# Patient Record
Sex: MALE | Race: WHITE | NOT HISPANIC OR LATINO | Employment: OTHER | URBAN - METROPOLITAN AREA
[De-identification: names, ages, dates, MRNs, and addresses within clinical notes are randomized per-mention and may not be internally consistent; named-entity substitution may affect disease eponyms.]

---

## 2017-01-12 ENCOUNTER — HOSPITAL ENCOUNTER (OUTPATIENT)
Dept: RADIOLOGY | Facility: MEDICAL CENTER | Age: 67
End: 2017-01-12
Attending: OPHTHALMOLOGY
Payer: MEDICARE

## 2017-01-12 DIAGNOSIS — H35.89 RETINAL MACULAR ATROPHY: ICD-10-CM

## 2017-01-12 PROCEDURE — 93880 EXTRACRANIAL BILAT STUDY: CPT | Mod: 26 | Performed by: SURGERY

## 2017-01-12 PROCEDURE — 93880 EXTRACRANIAL BILAT STUDY: CPT

## 2017-04-04 ENCOUNTER — OFFICE VISIT (OUTPATIENT)
Dept: URGENT CARE | Facility: CLINIC | Age: 67
End: 2017-04-04
Payer: MEDICARE

## 2017-04-04 ENCOUNTER — APPOINTMENT (OUTPATIENT)
Dept: RADIOLOGY | Facility: IMAGING CENTER | Age: 67
End: 2017-04-04
Attending: NURSE PRACTITIONER
Payer: MEDICARE

## 2017-04-04 VITALS
TEMPERATURE: 97.8 F | SYSTOLIC BLOOD PRESSURE: 120 MMHG | HEART RATE: 64 BPM | RESPIRATION RATE: 18 BRPM | OXYGEN SATURATION: 97 % | DIASTOLIC BLOOD PRESSURE: 78 MMHG

## 2017-04-04 DIAGNOSIS — R06.02 SOB (SHORTNESS OF BREATH): ICD-10-CM

## 2017-04-04 DIAGNOSIS — R05.9 COUGH: ICD-10-CM

## 2017-04-04 DIAGNOSIS — J06.9 UPPER RESPIRATORY TRACT INFECTION, UNSPECIFIED TYPE: ICD-10-CM

## 2017-04-04 PROCEDURE — G8421 BMI NOT CALCULATED: HCPCS | Performed by: NURSE PRACTITIONER

## 2017-04-04 PROCEDURE — 1101F PT FALLS ASSESS-DOCD LE1/YR: CPT | Mod: 8P | Performed by: NURSE PRACTITIONER

## 2017-04-04 PROCEDURE — 99214 OFFICE O/P EST MOD 30 MIN: CPT | Performed by: NURSE PRACTITIONER

## 2017-04-04 PROCEDURE — 71020 DX-CHEST-2 VIEWS: CPT | Mod: TC | Performed by: NURSE PRACTITIONER

## 2017-04-04 PROCEDURE — 4004F PT TOBACCO SCREEN RCVD TLK: CPT | Mod: 8P | Performed by: NURSE PRACTITIONER

## 2017-04-04 PROCEDURE — G8432 DEP SCR NOT DOC, RNG: HCPCS | Performed by: NURSE PRACTITIONER

## 2017-04-04 PROCEDURE — 3017F COLORECTAL CA SCREEN DOC REV: CPT | Mod: 8P | Performed by: NURSE PRACTITIONER

## 2017-04-04 PROCEDURE — 4040F PNEUMOC VAC/ADMIN/RCVD: CPT | Mod: 8P | Performed by: NURSE PRACTITIONER

## 2017-04-04 RX ORDER — PREDNISONE 10 MG/1
TABLET ORAL
Qty: 15 TAB | Refills: 0 | Status: SHIPPED | OUTPATIENT
Start: 2017-04-04 | End: 2017-04-08

## 2017-04-04 RX ORDER — AZITHROMYCIN 250 MG/1
TABLET, FILM COATED ORAL
Qty: 6 TAB | Refills: 0 | Status: SHIPPED | OUTPATIENT
Start: 2017-04-04 | End: 2017-04-13

## 2017-04-04 ASSESSMENT — ENCOUNTER SYMPTOMS
COUGH: 1
WHEEZING: 0
FEVER: 0
CHILLS: 1
SHORTNESS OF BREATH: 1
SPUTUM PRODUCTION: 0

## 2017-04-04 ASSESSMENT — COPD QUESTIONNAIRES: COPD: 1

## 2017-04-04 NOTE — MR AVS SNAPSHOT
Edwin Gregorio   2017 4:30 PM   Office Visit   MRN: [id number]    Department:  [facility]   Dept Phone:  [phone]    Description:  Male : 1950   Provider:  RUBÉN Lowery           Reason for Visit     Cough Almost a week dry cough and chest congestion .      Allergies as of 2017     Allergen Noted Reactions    Augmentin 10/18/2014         You were diagnosed with     Upper respiratory tract infection, unspecified type   [4077639]       Cough   [786.2.ICD-9-CM]       SOB (shortness of breath)   [604216]         Vital Signs     Blood Pressure Pulse Temperature Respirations Oxygen Saturation Smoking Status    120/78 mmHg 64 36.6 °C (97.8 °F) 18 97% Current Every Day Smoker      Basic Information     Date Of Birth Sex Race Ethnicity Preferred Language    1950 Male White Non- English      Problem List              ICD-10-CM Priority Class Noted - Resolved    Confusion R41.0   10/18/2014 - Present    Chest pain R07.9   10/18/2014 - Present      Health Maintenance        Date Due Completion Dates    IMM DTaP/Tdap/Td Vaccine (1 - Tdap) 1970 ---    COLONOSCOPY 2001 ---    IMM ZOSTER VACCINE 2011 ---    IMM PNEUMOCOCCAL 65+ (ADULT) LOW/MEDIUM RISK SERIES (1 of 2 - PCV13) 2016 ---            Current Immunizations     No immunizations on file.      Below and/or attached are the medications your provider expects you to take. Review all of your home medications and newly ordered medications with your provider and/or pharmacist. Follow medication instructions as directed by your provider and/or pharmacist. Please keep your medication list with you and share with your provider. Update the information when medications are discontinued, doses are changed, or new medications (including over-the-counter products) are added; and carry medication information at all times in the event of emergency situations     Allergies:  AUGMENTIN - (reactions not  documented)               Medications  Valid as of: April 04, 2017 -  5:21 PM    Generic Name Brand Name Tablet Size Instructions for use    Atorvastatin Calcium (Tab) LIPITOR 20 MG Take 40 mg by mouth every evening.        Azithromycin (Tab) ZITHROMAX 250 MG Take 2 tabs PO on the first day, then one tab PO daily for 4 days        Clindamycin HCl (Cap) CLEOCIN 300 MG Take 1 Cap by mouth 4 times a day.        Lansoprazole (CAPSULE DELAYED RELEASE) PREVACID 30 MG Take 30 mg by mouth every day.        Morphine Sulfate   Take 15 mg by mouth 2 Times a Day.        Multiple Vitamin   Take  by mouth.        OLANZapine (Tab) ZYPREXA 5 MG Take 5 mg by mouth every evening.        Omega-3 Fatty Acids (Cap) fish oil 1000 MG Take 1,000 mg by mouth 3 times a day, with meals.        PARoxetine HCl (Tab) PAXIL 30 MG Take 30 mg by mouth 2 Times a Day.        PredniSONE (Tab) DELTASONE 10 MG Take 3 tabs PO daily for five days        SUMAtriptan Succinate (Tab) IMITREX 50 MG Take 100 mg by mouth Once PRN.        Topiramate (Tab) TOPAMAX 100 MG Take 100 mg by mouth 2 times a day.        TraZODone HCl (Tab) DESYREL 100 MG Take 100 mg by mouth every evening.        .                 Medicines prescribed today were sent to:     [facility] #[id number] [address] [address]    Phone: [phone] Fax: [phone]    Open 24 Hours?: No      Medication refill instructions:       If your prescription bottle indicates you have medication refills left, it is not necessary to call your provider’s office. Please contact your pharmacy and they will refill your medication.    If your prescription bottle indicates you do not have any refills left, you may request refills at any time through one of the following ways: The online BillGuard system (except Urgent Care), by calling your provider’s office, or by asking your pharmacy to contact your provider’s office with a refill request.  Medication refills are processed only during regular business hours and may not be available until the next business day. Your provider may request additional information or to have a follow-up visit with you prior to refilling your medication.   *Please Note: Medication refills are assigned a new Rx number when refilled electronically. Your pharmacy may indicate that no refills were authorized even though a new prescription for the same medication is available at the pharmacy. Please request the medicine by name with the pharmacy before contacting your provider for a refill.        Your To Do List     Future Labs/Procedures Complete By Expires    DX-CHEST-2 VIEWS  As directed 4/4/2018         Ziptask Access Code: [email or web address]  Expires: 5/4/2017  5:21 PM    Ziptask  A secure, online tool to manage your health information     [employer]’s Ziptask® is a secure, online tool that connects you to your personalized health information from the privacy of your home -- day or night - making it very easy for you to manage your healthcare. Once the activation process is completed, you can even access your medical information using the Ziptask machelle, which is available for free in the Apple Machelle store or Google Play store.     Ziptask provides the following levels of access (as shown below):   My Chart Features   [facility] Primary Care Doctor [facility]  Specialists [facility]  Urgent  Care Non-[facility]  Primary Care  Doctor   Email your healthcare team securely and privately 24/7 X X X    Manage appointments: schedule your next appointment; view details of past/upcoming appointments X      Request prescription refills. X      View recent personal medical records, including lab and immunizations X X X X   View health record, including health history, allergies, medications X X X X   Read reports about your outpatient visits, procedures, consult and ER notes X X X X   See your discharge summary, which is a recap of your hospital and/or  ER visit that includes your diagnosis, lab results, and care plan. X X       How to register for HackerRank:  1. Go to  [email or web address].  2. Click on the Sign Up Now box, which takes you to the New Member Sign Up page. You will need to provide the following information:  a. Enter your HackerRank Access Code exactly as it appears at the top of this page. (You will not need to use this code after you’ve completed the sign-up process. If you do not sign up before the expiration date, you must request a new code.)   b. Enter your date of birth.   c. Enter your home email address.   d. Click Submit, and follow the next screen’s instructions.  3. Create a CloudCart ID. This will be your CloudCart login ID and cannot be changed, so think of one that is secure and easy to remember.  4. Create a CloudCart password. You can change your password at any time.  5. Enter your Password Reset Question and Answer. This can be used at a later time if you forget your password.   6. Enter your e-mail address. This allows you to receive e-mail notifications when new information is available in HackerRank.  7. Click Sign Up. You can now view your health information.    For assistance activating your HackerRank account, call [phone]

## 2017-04-05 NOTE — PROGRESS NOTES
Subjective:      Edwin Gregorio is a 66 y.o. male who presents with Cough            Cough  This is a new problem. Episode onset: on and off for over one month. The problem has been unchanged. The cough is non-productive. Associated symptoms include chills and shortness of breath. Pertinent negatives include no fever or wheezing. Associated symptoms comments: Chest congestion. Nothing aggravates the symptoms. Risk factors for lung disease include smoking/tobacco exposure. He has tried OTC cough suppressant and rest for the symptoms. The treatment provided no relief. His past medical history is significant for COPD.       Review of Systems   Constitutional: Positive for chills and malaise/fatigue. Negative for fever.   HENT: Positive for congestion.    Respiratory: Positive for cough and shortness of breath. Negative for sputum production and wheezing.    All other systems reviewed and are negative.    PMH:  has a past medical history of Depression and Chronic airway obstruction, not elsewhere classified. He also has no past medical history of Hypertension.  MEDS:   Current outpatient prescriptions:   •  azithromycin (ZITHROMAX) 250 MG Tab, Take 2 tabs PO on the first day, then one tab PO daily for 4 days, Disp: 6 Tab, Rfl: 0  •  predniSONE (DELTASONE) 10 MG Tab, Take 3 tabs PO daily for five days, Disp: 15 Tab, Rfl: 0  •  lansoprazole (PREVACID) 30 MG CAPSULE DELAYED RELEASE, Take 30 mg by mouth every day., Disp: , Rfl:   •  MORPHINE SULFATE, CONCENTRATE, PO, Take 15 mg by mouth 2 Times a Day., Disp: , Rfl:   •  topiramate (TOPAMAX) 100 MG TABS, Take 100 mg by mouth 2 times a day., Disp: , Rfl:   •  paroxetine (PAXIL) 30 MG TABS, Take 30 mg by mouth 2 Times a Day., Disp: , Rfl:   •  sumatriptan (IMITREX) 50 MG TABS, Take 100 mg by mouth Once PRN., Disp: , Rfl:   •  trazodone (DESYREL) 100 MG TABS, Take 100 mg by mouth every evening., Disp: , Rfl:   •  atorvastatin (LIPITOR) 20 MG TABS, Take 40 mg by mouth every  evening., Disp: , Rfl:   •  olanzapine (ZYPREXA) 5 MG TABS, Take 5 mg by mouth every evening., Disp: , Rfl:   •  Omega-3 Fatty Acids (FISH OIL) 1000 MG Cap capsule, Take 1,000 mg by mouth 3 times a day, with meals., Disp: , Rfl:   •  Multiple Vitamin (MULTI VITAMIN DAILY PO), Take  by mouth., Disp: , Rfl:   •  clindamycin (CLEOCIN) 300 MG Cap, Take 1 Cap by mouth 4 times a day., Disp: 40 Cap, Rfl: 0  ALLERGIES:   Allergies   Allergen Reactions   • Augmentin      SURGHX: No past surgical history on file.  SOCHX:  reports that he has been smoking Cigarettes.  He has been smoking about 1.00 pack per day. He does not have any smokeless tobacco history on file. He reports that he does not drink alcohol or use illicit drugs.  FH: In accordance with VINCE Act of 2008, family history is not collected for Occupational Health visits.         Objective:     /78 mmHg  Pulse 64  Temp(Src) 36.6 °C (97.8 °F)  Resp 18  SpO2 97%     Physical Exam   Constitutional: He is oriented to person, place, and time. Vital signs are normal. He appears well-developed and well-nourished.   HENT:   Head: Normocephalic.   Right Ear: External ear normal.   Left Ear: External ear normal.   Nose: Rhinorrhea present.   Mouth/Throat: Oropharynx is clear and moist.   Eyes: EOM are normal. Pupils are equal, round, and reactive to light.   Neck: Normal range of motion.   Cardiovascular: Normal rate and regular rhythm.    Pulmonary/Chest: Effort normal and breath sounds normal. No respiratory distress.   Musculoskeletal: Normal range of motion.   Lymphadenopathy:        Head (right side): Submandibular adenopathy present.        Head (left side): Submandibular adenopathy present.     He has no cervical adenopathy.   Neurological: He is alert and oriented to person, place, and time.   Skin: Skin is warm and dry.   Psychiatric: He has a normal mood and affect. His speech is normal and behavior is normal. Thought content normal.   Vitals  reviewed.         CXR: no acute cardiopulmonary process by my read, radiology pending.  4/4/2017 4:59 PM    HISTORY/REASON FOR EXAM:  Shortness of Breath  Cough few days      TECHNIQUE/EXAM DESCRIPTION AND NUMBER OF VIEWS:  Two views of the chest.    COMPARISON:  10/18/2014.    FINDINGS:  No pulmonary infiltrates or consolidations are noted.  No pleural effusions, no pneumothorax are appreciated.  Stable cardiopericardial silhouette.         Impression          1. No active cardiopulmonary abnormalities are identified.          Assessment/Plan:     1. Upper respiratory tract infection, unspecified type  - azithromycin (ZITHROMAX) 250 MG Tab; Take 2 tabs PO on the first day, then one tab PO daily for 4 days  Dispense: 6 Tab; Refill: 0    2. Cough  - DX-CHEST-2 VIEWS; Future  - predniSONE (DELTASONE) 10 MG Tab; Take 3 tabs PO daily for five days  Dispense: 15 Tab; Refill: 0    3. SOB (shortness of breath)  - DX-CHEST-2 VIEWS; Future  - predniSONE (DELTASONE) 10 MG Tab; Take 3 tabs PO daily for five days  Dispense: 15 Tab; Refill: 0    Increase fluid intake  Contingent antibiotic prescription given to patient to fill upon meeting criteria of guidelines discussed.   Supportive care, differential diagnoses, and indications for immediate follow-up discussed with patient.    Pathogenesis of diagnosis discussed including typical length and natural progression.      Instructed to return to  or nearest emergency department if symptoms fail to improve, for any change in condition, further concerns, or new concerning symptoms.  Patient states understanding of the plan of care and discharge instructions.

## 2017-04-13 ENCOUNTER — RESOLUTE PROFESSIONAL BILLING HOSPITAL PROF FEE (OUTPATIENT)
Dept: HOSPITALIST | Facility: MEDICAL CENTER | Age: 67
End: 2017-04-13
Payer: MEDICARE

## 2017-04-13 ENCOUNTER — APPOINTMENT (OUTPATIENT)
Dept: RADIOLOGY | Facility: MEDICAL CENTER | Age: 67
End: 2017-04-13
Attending: FAMILY MEDICINE
Payer: MEDICARE

## 2017-04-13 ENCOUNTER — APPOINTMENT (OUTPATIENT)
Dept: RADIOLOGY | Facility: MEDICAL CENTER | Age: 67
End: 2017-04-13
Attending: EMERGENCY MEDICINE
Payer: MEDICARE

## 2017-04-13 ENCOUNTER — HOSPITAL ENCOUNTER (OUTPATIENT)
Facility: MEDICAL CENTER | Age: 67
End: 2017-04-14
Attending: EMERGENCY MEDICINE | Admitting: HOSPITALIST
Payer: MEDICARE

## 2017-04-13 DIAGNOSIS — R94.31 ABNORMAL EKG: ICD-10-CM

## 2017-04-13 DIAGNOSIS — R06.02 SHORTNESS OF BREATH: ICD-10-CM

## 2017-04-13 DIAGNOSIS — R07.9 ACUTE CHEST PAIN: ICD-10-CM

## 2017-04-13 LAB
ANION GAP SERPL CALC-SCNC: 8 MMOL/L (ref 0–11.9)
APTT PPP: 33.2 SEC (ref 24.7–36)
BASOPHILS # BLD AUTO: 0.6 % (ref 0–1.8)
BASOPHILS # BLD: 0.07 K/UL (ref 0–0.12)
BNP SERPL-MCNC: 20 PG/ML (ref 0–100)
BUN SERPL-MCNC: 13 MG/DL (ref 8–22)
CALCIUM SERPL-MCNC: 8.7 MG/DL (ref 8.4–10.2)
CHLORIDE SERPL-SCNC: 104 MMOL/L (ref 96–112)
CO2 SERPL-SCNC: 26 MMOL/L (ref 20–33)
CREAT SERPL-MCNC: 1.39 MG/DL (ref 0.5–1.4)
EKG IMPRESSION: NORMAL
EKG IMPRESSION: NORMAL
EOSINOPHIL # BLD AUTO: 0.51 K/UL (ref 0–0.51)
EOSINOPHIL NFR BLD: 4.7 % (ref 0–6.9)
ERYTHROCYTE [DISTWIDTH] IN BLOOD BY AUTOMATED COUNT: 47.2 FL (ref 35.9–50)
GFR SERPL CREATININE-BSD FRML MDRD: 51 ML/MIN/1.73 M 2
GLUCOSE SERPL-MCNC: 153 MG/DL (ref 65–99)
HCT VFR BLD AUTO: 41.8 % (ref 42–52)
HGB BLD-MCNC: 14.2 G/DL (ref 14–18)
IMM GRANULOCYTES # BLD AUTO: 0.06 K/UL (ref 0–0.11)
IMM GRANULOCYTES NFR BLD AUTO: 0.6 % (ref 0–0.9)
INR PPP: 0.92 (ref 0.87–1.13)
LV EJECT FRACT  99904: 65
LV EJECT FRACT MOD 2C 99903: 67.83
LV EJECT FRACT MOD 4C 99902: 63.59
LV EJECT FRACT MOD BP 99901: 66.33
LYMPHOCYTES # BLD AUTO: 2.22 K/UL (ref 1–4.8)
LYMPHOCYTES NFR BLD: 20.5 % (ref 22–41)
MAGNESIUM SERPL-MCNC: 2.1 MG/DL (ref 1.5–2.5)
MCH RBC QN AUTO: 33.3 PG (ref 27–33)
MCHC RBC AUTO-ENTMCNC: 34 G/DL (ref 33.7–35.3)
MCV RBC AUTO: 97.9 FL (ref 81.4–97.8)
MONOCYTES # BLD AUTO: 0.69 K/UL (ref 0–0.85)
MONOCYTES NFR BLD AUTO: 6.4 % (ref 0–13.4)
NEUTROPHILS # BLD AUTO: 7.27 K/UL (ref 1.82–7.42)
NEUTROPHILS NFR BLD: 67.2 % (ref 44–72)
NRBC # BLD AUTO: 0 K/UL
NRBC BLD AUTO-RTO: 0 /100 WBC
PLATELET # BLD AUTO: 261 K/UL (ref 164–446)
PMV BLD AUTO: 10.7 FL (ref 9–12.9)
POTASSIUM SERPL-SCNC: 3.5 MMOL/L (ref 3.6–5.5)
PROTHROMBIN TIME: 12.2 SEC (ref 12–14.6)
RBC # BLD AUTO: 4.27 M/UL (ref 4.7–6.1)
SODIUM SERPL-SCNC: 138 MMOL/L (ref 135–145)
TROPONIN I SERPL-MCNC: <0.02 NG/ML (ref 0–0.04)
WBC # BLD AUTO: 10.8 K/UL (ref 4.8–10.8)

## 2017-04-13 PROCEDURE — 96374 THER/PROPH/DIAG INJ IV PUSH: CPT | Mod: XU

## 2017-04-13 PROCEDURE — 36415 COLL VENOUS BLD VENIPUNCTURE: CPT

## 2017-04-13 PROCEDURE — 94640 AIRWAY INHALATION TREATMENT: CPT

## 2017-04-13 PROCEDURE — 700102 HCHG RX REV CODE 250 W/ 637 OVERRIDE(OP): Performed by: HOSPITALIST

## 2017-04-13 PROCEDURE — 700111 HCHG RX REV CODE 636 W/ 250 OVERRIDE (IP): Performed by: HOSPITALIST

## 2017-04-13 PROCEDURE — 84484 ASSAY OF TROPONIN QUANT: CPT

## 2017-04-13 PROCEDURE — G0378 HOSPITAL OBSERVATION PER HR: HCPCS

## 2017-04-13 PROCEDURE — 85730 THROMBOPLASTIN TIME PARTIAL: CPT

## 2017-04-13 PROCEDURE — 94760 N-INVAS EAR/PLS OXIMETRY 1: CPT

## 2017-04-13 PROCEDURE — 700101 HCHG RX REV CODE 250: Performed by: EMERGENCY MEDICINE

## 2017-04-13 PROCEDURE — 93005 ELECTROCARDIOGRAM TRACING: CPT | Performed by: EMERGENCY MEDICINE

## 2017-04-13 PROCEDURE — 85610 PROTHROMBIN TIME: CPT

## 2017-04-13 PROCEDURE — 99220 PR INITIAL OBSERVATION CARE,LEVL III: CPT | Performed by: HOSPITALIST

## 2017-04-13 PROCEDURE — 83735 ASSAY OF MAGNESIUM: CPT

## 2017-04-13 PROCEDURE — 80048 BASIC METABOLIC PNL TOTAL CA: CPT

## 2017-04-13 PROCEDURE — 93005 ELECTROCARDIOGRAM TRACING: CPT

## 2017-04-13 PROCEDURE — 85025 COMPLETE CBC W/AUTO DIFF WBC: CPT

## 2017-04-13 PROCEDURE — 83880 ASSAY OF NATRIURETIC PEPTIDE: CPT

## 2017-04-13 PROCEDURE — 71010 DX-CHEST-PORTABLE (1 VIEW): CPT

## 2017-04-13 PROCEDURE — 99285 EMERGENCY DEPT VISIT HI MDM: CPT

## 2017-04-13 PROCEDURE — A9270 NON-COVERED ITEM OR SERVICE: HCPCS | Performed by: HOSPITALIST

## 2017-04-13 PROCEDURE — 93306 TTE W/DOPPLER COMPLETE: CPT

## 2017-04-13 PROCEDURE — 700105 HCHG RX REV CODE 258: Performed by: HOSPITALIST

## 2017-04-13 PROCEDURE — 700111 HCHG RX REV CODE 636 W/ 250 OVERRIDE (IP)

## 2017-04-13 PROCEDURE — 96375 TX/PRO/DX INJ NEW DRUG ADDON: CPT

## 2017-04-13 PROCEDURE — 700111 HCHG RX REV CODE 636 W/ 250 OVERRIDE (IP): Performed by: EMERGENCY MEDICINE

## 2017-04-13 PROCEDURE — 700101 HCHG RX REV CODE 250: Performed by: HOSPITALIST

## 2017-04-13 PROCEDURE — 93306 TTE W/DOPPLER COMPLETE: CPT | Mod: 26 | Performed by: INTERNAL MEDICINE

## 2017-04-13 RX ORDER — MORPHINE SULFATE 15 MG/1
15 TABLET, FILM COATED, EXTENDED RELEASE ORAL EVERY 12 HOURS
Status: DISCONTINUED | OUTPATIENT
Start: 2017-04-13 | End: 2017-04-14 | Stop reason: HOSPADM

## 2017-04-13 RX ORDER — HALOPERIDOL 5 MG/ML
INJECTION INTRAMUSCULAR
Status: COMPLETED
Start: 2017-04-13 | End: 2017-04-13

## 2017-04-13 RX ORDER — HEPARIN SODIUM 5000 [USP'U]/ML
5000 INJECTION, SOLUTION INTRAVENOUS; SUBCUTANEOUS EVERY 8 HOURS
Status: DISCONTINUED | OUTPATIENT
Start: 2017-04-13 | End: 2017-04-14 | Stop reason: HOSPADM

## 2017-04-13 RX ORDER — TRAZODONE HYDROCHLORIDE 50 MG/1
150 TABLET ORAL NIGHTLY
Status: DISCONTINUED | OUTPATIENT
Start: 2017-04-13 | End: 2017-04-14 | Stop reason: HOSPADM

## 2017-04-13 RX ORDER — BISACODYL 10 MG
10 SUPPOSITORY, RECTAL RECTAL
Status: DISCONTINUED | OUTPATIENT
Start: 2017-04-13 | End: 2017-04-14 | Stop reason: HOSPADM

## 2017-04-13 RX ORDER — CLINDAMYCIN HYDROCHLORIDE 300 MG/1
300 CAPSULE ORAL 4 TIMES DAILY
COMMUNITY
Start: 2017-03-03 | End: 2017-08-19

## 2017-04-13 RX ORDER — HALOPERIDOL 5 MG/ML
4 INJECTION INTRAMUSCULAR EVERY 4 HOURS PRN
Status: DISCONTINUED | OUTPATIENT
Start: 2017-04-13 | End: 2017-04-14 | Stop reason: HOSPADM

## 2017-04-13 RX ORDER — TOPIRAMATE 100 MG/1
100 TABLET, FILM COATED ORAL 2 TIMES DAILY
Status: DISCONTINUED | OUTPATIENT
Start: 2017-04-13 | End: 2017-04-14 | Stop reason: HOSPADM

## 2017-04-13 RX ORDER — ONDANSETRON 2 MG/ML
4 INJECTION INTRAMUSCULAR; INTRAVENOUS EVERY 4 HOURS PRN
Status: DISCONTINUED | OUTPATIENT
Start: 2017-04-13 | End: 2017-04-14 | Stop reason: HOSPADM

## 2017-04-13 RX ORDER — LORAZEPAM 1 MG/1
1 TABLET ORAL ONCE
Status: COMPLETED | OUTPATIENT
Start: 2017-04-13 | End: 2017-04-13

## 2017-04-13 RX ORDER — CHLORAL HYDRATE 500 MG
1000 CAPSULE ORAL DAILY
Status: DISCONTINUED | OUTPATIENT
Start: 2017-04-14 | End: 2017-04-14 | Stop reason: HOSPADM

## 2017-04-13 RX ORDER — HALOPERIDOL 1 MG/1
2 TABLET ORAL EVERY 8 HOURS PRN
Status: DISCONTINUED | OUTPATIENT
Start: 2017-04-13 | End: 2017-04-14 | Stop reason: HOSPADM

## 2017-04-13 RX ORDER — RISPERIDONE 1 MG/1
0.25 TABLET ORAL
Status: DISCONTINUED | OUTPATIENT
Start: 2017-04-13 | End: 2017-04-14 | Stop reason: HOSPADM

## 2017-04-13 RX ORDER — IPRATROPIUM BROMIDE AND ALBUTEROL SULFATE 2.5; .5 MG/3ML; MG/3ML
3 SOLUTION RESPIRATORY (INHALATION)
Status: DISCONTINUED | OUTPATIENT
Start: 2017-04-13 | End: 2017-04-14 | Stop reason: HOSPADM

## 2017-04-13 RX ORDER — LORAZEPAM 2 MG/ML
.5-1 INJECTION INTRAMUSCULAR EVERY 4 HOURS PRN
Status: DISCONTINUED | OUTPATIENT
Start: 2017-04-13 | End: 2017-04-14 | Stop reason: HOSPADM

## 2017-04-13 RX ORDER — NICOTINE 21 MG/24HR
21 PATCH, TRANSDERMAL 24 HOURS TRANSDERMAL
Status: DISCONTINUED | OUTPATIENT
Start: 2017-04-13 | End: 2017-04-14 | Stop reason: HOSPADM

## 2017-04-13 RX ORDER — POLYETHYLENE GLYCOL 3350 17 G/17G
1 POWDER, FOR SOLUTION ORAL
Status: DISCONTINUED | OUTPATIENT
Start: 2017-04-13 | End: 2017-04-14 | Stop reason: HOSPADM

## 2017-04-13 RX ORDER — ACETAMINOPHEN 325 MG/1
650 TABLET ORAL EVERY 6 HOURS PRN
Status: DISCONTINUED | OUTPATIENT
Start: 2017-04-13 | End: 2017-04-14 | Stop reason: HOSPADM

## 2017-04-13 RX ORDER — POTASSIUM CHLORIDE 20 MEQ/1
60 TABLET, EXTENDED RELEASE ORAL ONCE
Status: COMPLETED | OUTPATIENT
Start: 2017-04-13 | End: 2017-04-13

## 2017-04-13 RX ORDER — METHYLPREDNISOLONE SODIUM SUCCINATE 125 MG/2ML
125 INJECTION, POWDER, LYOPHILIZED, FOR SOLUTION INTRAMUSCULAR; INTRAVENOUS ONCE
Status: COMPLETED | OUTPATIENT
Start: 2017-04-13 | End: 2017-04-13

## 2017-04-13 RX ORDER — ONDANSETRON 4 MG/1
4 TABLET, ORALLY DISINTEGRATING ORAL EVERY 4 HOURS PRN
Status: DISCONTINUED | OUTPATIENT
Start: 2017-04-13 | End: 2017-04-14 | Stop reason: HOSPADM

## 2017-04-13 RX ORDER — SODIUM CHLORIDE 9 MG/ML
INJECTION, SOLUTION INTRAVENOUS CONTINUOUS
Status: DISCONTINUED | OUTPATIENT
Start: 2017-04-13 | End: 2017-04-14 | Stop reason: HOSPADM

## 2017-04-13 RX ORDER — SUMATRIPTAN 25 MG/1
100 TABLET, FILM COATED ORAL
Status: DISCONTINUED | OUTPATIENT
Start: 2017-04-13 | End: 2017-04-14 | Stop reason: HOSPADM

## 2017-04-13 RX ORDER — OMEPRAZOLE 20 MG/1
20 CAPSULE, DELAYED RELEASE ORAL
Status: DISCONTINUED | OUTPATIENT
Start: 2017-04-13 | End: 2017-04-14 | Stop reason: HOSPADM

## 2017-04-13 RX ORDER — AZITHROMYCIN 250 MG/1
250-500 TABLET, FILM COATED ORAL DAILY
COMMUNITY
Start: 2017-04-04 | End: 2017-08-19

## 2017-04-13 RX ORDER — AMOXICILLIN 250 MG
2 CAPSULE ORAL 2 TIMES DAILY
Status: DISCONTINUED | OUTPATIENT
Start: 2017-04-13 | End: 2017-04-14 | Stop reason: HOSPADM

## 2017-04-13 RX ORDER — ATORVASTATIN CALCIUM 40 MG/1
40 TABLET, FILM COATED ORAL NIGHTLY
Status: DISCONTINUED | OUTPATIENT
Start: 2017-04-13 | End: 2017-04-14 | Stop reason: HOSPADM

## 2017-04-13 RX ORDER — LANSOPRAZOLE 30 MG/1
30 CAPSULE, DELAYED RELEASE ORAL EVERY EVENING
Status: DISCONTINUED | OUTPATIENT
Start: 2017-04-13 | End: 2017-04-13

## 2017-04-13 RX ORDER — OLANZAPINE 5 MG/1
5 TABLET ORAL NIGHTLY
Status: DISCONTINUED | OUTPATIENT
Start: 2017-04-13 | End: 2017-04-14 | Stop reason: HOSPADM

## 2017-04-13 RX ORDER — LEVOFLOXACIN 500 MG/1
750 TABLET, FILM COATED ORAL DAILY
Status: DISCONTINUED | OUTPATIENT
Start: 2017-04-13 | End: 2017-04-14 | Stop reason: HOSPADM

## 2017-04-13 RX ORDER — MORPHINE SULFATE 15 MG/1
15 TABLET, FILM COATED, EXTENDED RELEASE ORAL EVERY 12 HOURS
COMMUNITY

## 2017-04-13 RX ADMIN — TOPIRAMATE 100 MG: 100 TABLET, FILM COATED ORAL at 20:04

## 2017-04-13 RX ADMIN — HALOPERIDOL LACTATE 4 MG: 5 INJECTION, SOLUTION INTRAMUSCULAR at 22:52

## 2017-04-13 RX ADMIN — METHYLPREDNISOLONE SODIUM SUCCINATE 125 MG: 125 INJECTION, POWDER, FOR SOLUTION INTRAMUSCULAR; INTRAVENOUS at 10:30

## 2017-04-13 RX ADMIN — NICOTINE 21 MG: 21 PATCH TRANSDERMAL at 14:00

## 2017-04-13 RX ADMIN — LEVOFLOXACIN 750 MG: 500 TABLET, FILM COATED ORAL at 14:57

## 2017-04-13 RX ADMIN — HALOPERIDOL 2 MG: 1 TABLET ORAL at 19:51

## 2017-04-13 RX ADMIN — OMEPRAZOLE 20 MG: 20 CAPSULE, DELAYED RELEASE ORAL at 20:04

## 2017-04-13 RX ADMIN — LORAZEPAM 1 MG: 1 TABLET ORAL at 21:42

## 2017-04-13 RX ADMIN — HALOPERIDOL 4 MG: 5 INJECTION INTRAMUSCULAR at 22:52

## 2017-04-13 RX ADMIN — TRAZODONE HYDROCHLORIDE 150 MG: 50 TABLET ORAL at 20:04

## 2017-04-13 RX ADMIN — MORPHINE SULFATE 15 MG: 15 TABLET, EXTENDED RELEASE ORAL at 20:04

## 2017-04-13 RX ADMIN — RISPERIDONE 0.25 MG: 1 TABLET ORAL at 17:47

## 2017-04-13 RX ADMIN — OLANZAPINE 5 MG: 5 TABLET, FILM COATED ORAL at 20:04

## 2017-04-13 RX ADMIN — ATORVASTATIN CALCIUM 40 MG: 40 TABLET, FILM COATED ORAL at 20:04

## 2017-04-13 RX ADMIN — HEPARIN SODIUM 5000 UNITS: 5000 INJECTION, SOLUTION INTRAVENOUS; SUBCUTANEOUS at 14:57

## 2017-04-13 RX ADMIN — ALBUTEROL SULFATE 2.5 MG: 2.5 SOLUTION RESPIRATORY (INHALATION) at 12:13

## 2017-04-13 RX ADMIN — SODIUM CHLORIDE: 9 INJECTION, SOLUTION INTRAVENOUS at 14:49

## 2017-04-13 RX ADMIN — POTASSIUM CHLORIDE 60 MEQ: 1500 TABLET, EXTENDED RELEASE ORAL at 14:00

## 2017-04-13 RX ADMIN — HEPARIN SODIUM 5000 UNITS: 5000 INJECTION, SOLUTION INTRAVENOUS; SUBCUTANEOUS at 20:04

## 2017-04-13 ASSESSMENT — LIFESTYLE VARIABLES
EVER_SMOKED: YES
EVER_SMOKED: YES
ALCOHOL_USE: NO

## 2017-04-13 ASSESSMENT — COPD QUESTIONNAIRES
DURING THE PAST 4 WEEKS HOW MUCH DID YOU FEEL SHORT OF BREATH: SOME OF THE TIME
DO YOU EVER COUGH UP ANY MUCUS OR PHLEGM?: YES, A FEW DAYS A WEEK OR MONTH
COPD SCREENING SCORE: 7
HAVE YOU SMOKED AT LEAST 100 CIGARETTES IN YOUR ENTIRE LIFE: YES

## 2017-04-13 ASSESSMENT — PAIN SCALES - GENERAL
PAINLEVEL_OUTOF10: 0

## 2017-04-13 NOTE — ED NOTES
Med rec complete per Pt's SO at bedside  Allergies reviewe  Pt completed a 5 day course of Azithromycin on 4/8  Pt completed a 10 day course of Clindamycin on 3/12

## 2017-04-13 NOTE — IP AVS SNAPSHOT
" <p align=\"LEFT\"><IMG SRC=\"//[email or web address]\" alt=\"Image\" WIDTH=\"50%\" HEIGHT=\"200\" BORDER=\"\"></p>                   Name:Edwin Gregorio  Medical Record Number:[id number]  CSN: [id number]    YOB: 1950   Age: 66 y.o.  Sex: male  HT:1.727 m (5' 8\") WT: 78.9 kg (173 lb 15.1 oz)          Admit Date: 4/13/2017     Discharge Date:   Today's Date: 4/14/2017  Attending Doctor:  Lorenzo Duong D.O.                  Allergies:  Augmentin          Your appointments     Apr 22, 2017  1:00 PM   CT BODY WITH with [facility] CT 2   [facility] [place]    [address]  [facility] [id number] [phone]           Taking medications as regularly scheduled is strongly encouraged.  *For Abdominal CT-Patient needs to  oral contrast and instruction from the department at least 2 hours prior to exam. Patient may  contrast at any imaging facility.              Follow-up Information     1. Follow up with Omar Griggs M.D.. Schedule an appointment as soon as possible for a visit in 1 week.    Specialty:  Family Medicine    Contact information    [address] [address]  [phone]           Medication List      Take these Medications        Instructions    atorvastatin 20 MG Tabs   Commonly known as:  LIPITOR    Take 40 mg by mouth every evening.   Dose:  40 mg       azithromycin 250 MG Tabs   Commonly known as:  ZITHROMAX    Take 250-500 mg by mouth every day. Pt started a 5 day course on 4/4   Dose:  250-500 mg       clindamycin 300 MG Caps   Commonly known as:  CLEOCIN    Take 300 mg by mouth 4 times a day. Pt started a 10 day course on 3/3   Dose:  300 mg       fish oil 1000 MG Caps capsule    Take 1,000 mg by mouth every day.   Dose:  1000 mg       lansoprazole 30 MG Cpdr   Commonly known as:  PREVACID    Take 30 mg by mouth every evening.   Dose:  30 mg       levofloxacin 750 MG tablet   Commonly known as:  LEVAQUIN    Take 1 Tab by mouth every day for 6 " days.   Dose:  750 mg       morphine ER 15 MG Tbcr tablet   Commonly known as:  MS CONTIN    Take 15 mg by mouth every 12 hours.   Dose:  15 mg       MULTI VITAMIN DAILY PO    Take 1 Tab by mouth every day.   Dose:  1 Tab       olanzapine 5 MG Tabs   Commonly known as:  ZYPREXA    Take 5 mg by mouth every evening.   Dose:  5 mg       paroxetine 30 MG Tabs   Commonly known as:  PAXIL    Take 45 mg by mouth every day.   Dose:  45 mg       sumatriptan 50 MG Tabs   Commonly known as:  IMITREX    Take 100 mg by mouth Once PRN.   Dose:  100 mg       topiramate 100 MG Tabs   Commonly known as:  TOPAMAX    Take 100 mg by mouth 2 times a day.   Dose:  100 mg       trazodone 100 MG Tabs   Commonly known as:  DESYREL    Take 150 mg by mouth every evening.   Dose:  150 mg

## 2017-04-13 NOTE — ED NOTES
1225 - Patient sleeping. Wife notified of transfer to inpatient room. Wife had requested private room. Charge was notified. Family was informed there are no private rooms available. Verbalized understanding.

## 2017-04-13 NOTE — ED NOTES
ERP at bedside. Pt agrees with plan of care discussed by ERP. AIDET acknowledged with patient. IV established. Blood sent to lab. Medicinal interventions carried out per ERP orders. Abdiasrfeliz in low position, side rail up for pt safety. Call light within reach. Will continue to monitor.

## 2017-04-13 NOTE — IP AVS SNAPSHOT
Askem Access Code: [email or web address]  Expires: 5/4/2017  5:21 PM    Askem  A secure, online tool to manage your health information     [employer]s Askem® is a secure, online tool that connects you to your personalized health information from the privacy of your home -- day or night - making it very easy for you to manage your healthcare. Once the activation process is completed, you can even access your medical information using the Askem machelle, which is available for free in the Apple Machelle store or Google Play store.     Askem provides the following levels of access (as shown below):   My Chart Features   [facility] Primary Care Doctor [facility]  Specialists [facility]  Urgent  Care Non-[facility]  Primary Care  Doctor   Email your healthcare team securely and privately 24/7 X X X X   Manage appointments: schedule your next appointment; view details of past/upcoming appointments X      Request prescription refills. X      View recent personal medical records, including lab and immunizations X X X X   View health record, including health history, allergies, medications X X X X   Read reports about your outpatient visits, procedures, consult and ER notes X X X X   See your discharge summary, which is a recap of your hospital and/or ER visit that includes your diagnosis, lab results, and care plan. X X       How to register for Askem:  1. Go to  [email or web address].  2. Click on the Sign Up Now box, which takes you to the New Member Sign Up page. You will need to provide the following information:  a. Enter your Askem Access Code exactly as it appears at the top of this page. (You will not need to use this code after you’ve completed the sign-up process. If you do not sign up before the expiration date, you must request a new code.)   b. Enter your date of birth.   c. Enter your home email address.   d. Click Submit, and follow the next screen’s instructions.  3. Create a Askem ID. This will be your Askem  login ID and cannot be changed, so think of one that is secure and easy to remember.  4. Create a OurStage password. You can change your password at any time.  5. Enter your Password Reset Question and Answer. This can be used at a later time if you forget your password.   6. Enter your e-mail address. This allows you to receive e-mail notifications when new information is available in OurStage.  7. Click Sign Up. You can now view your health information.    For assistance activating your OurStage account, call [phone]

## 2017-04-13 NOTE — ED PROVIDER NOTES
ED Provider Note    CHIEF COMPLAINT  Chief Complaint   Patient presents with   • Shortness of Breath       HPI  Edwin Gregorio is a 66 y.o. male who presents to the emergency department with a chief complaint of shortness of breath. The patient reports shortness of breath over the last month or 2 but has been particularly bad over last couple days. He feels exam difficulty breathing. He has difficulty taking a deep breath. Patient does not use albuterol or Atrovent at home. He has some mild chest discomfort. No vomiting. No fevers. No diaphoresis. No leg pain or swelling.    REVIEW OF SYSTEMS  See HPI for further details. All other systems are negative.     PAST MEDICAL HISTORY  Past Medical History   Diagnosis Date   • Depression    • Chronic airway obstruction, not elsewhere classified (CMS-HCC)        FAMILY HISTORY  No family history on file.    SOCIAL HISTORY  Social History     Social History   • Marital Status:      Spouse Name: N/A   • Number of Children: N/A   • Years of Education: N/A     Social History Main Topics   • Smoking status: Current Every Day Smoker -- 1.00 packs/day     Types: Cigarettes   • Smokeless tobacco: None   • Alcohol Use: No   • Drug Use: No   • Sexual Activity: No     Other Topics Concern   • None     Social History Narrative       SURGICAL HISTORY  History reviewed. No pertinent past surgical history.    CURRENT MEDICATIONS  Home Medications     Reviewed by Sarina Chambers (Pharmacy Tech) on 04/13/17 at 0950  Med List Status: Complete    Medication Last Dose Status    atorvastatin (LIPITOR) 20 MG TABS 4/12/2017 Active    azithromycin (ZITHROMAX) 250 MG Tab 4/8/2017 Active    clindamycin (CLEOCIN) 300 MG Cap 3/12/2017 Active    lansoprazole (PREVACID) 30 MG CAPSULE DELAYED RELEASE 4/12/2017 Active    morphine ER (MS CONTIN) 15 MG Tab CR tablet 4/13/2017 Active    Multiple Vitamin (MULTI VITAMIN DAILY PO) 4/13/2017 Active    olanzapine (ZYPREXA) 5 MG TABS 4/12/2017  "Active    Omega-3 Fatty Acids (FISH OIL) 1000 MG Cap capsule 4/13/2017 Active    paroxetine (PAXIL) 30 MG TABS 4/13/2017 Active    sumatriptan (IMITREX) 50 MG TABS >week Active    topiramate (TOPAMAX) 100 MG TABS 4/13/2017 Active    trazodone (DESYREL) 100 MG TABS 4/12/2017 Active                ALLERGIES  Allergies   Allergen Reactions   • Augmentin        PHYSICAL EXAM  VITAL SIGNS: /82 mmHg  Pulse 88  Temp(Src) 35.8 °C (96.4 °F)  Resp 18  Ht 1.727 m (5' 8\")  Wt 78.9 kg (173 lb 15.1 oz)  BMI 26.45 kg/m2  SpO2 97%  Constitutional: Well developed, Well nourished, No acute distress, Non-toxic appearance.   HENT: Normocephalic, Atraumatic, Bilateral external ears normal, Oropharynx moist, No oral exudates, Nose normal.   Eyes: PERRLA, EOMI, Conjunctiva normal, No discharge.   Neck: Normal range of motion, No tenderness, Supple, No stridor.   Cardiovascular: Regular rate and rhythm, no audible murmur  Thorax & Lungs: Slightly diminished breath sounds throughout, no wheezing, rales, or rhonchi.  Abdomen: Bowel sounds normal, Soft, No tenderness, No masses, No pulsatile masses.   Skin: Warm, Dry, No erythema, No rash.   Back: No tenderness, No CVA tenderness.   Extremities: Intact distal pulses, No tenderness, No cyanosis, No clubbing. No edema  Neurologic: Alert & oriented x 3, Normal motor function, Normal sensory function, No focal deficits noted.     EKG  See below    RADIOLOGY/PROCEDURES  DX-CHEST-PORTABLE (1 VIEW)   Final Result      Cardiomegaly without evidence of pulmonary edema.        Results for orders placed or performed during the hospital encounter of 04/13/17   CBC w/ Differential   Result Value Ref Range    WBC 10.8 4.8 - 10.8 K/uL    RBC 4.27 (L) 4.70 - 6.10 M/uL    Hemoglobin 14.2 14.0 - 18.0 g/dL    Hematocrit 41.8 (L) 42.0 - 52.0 %    MCV 97.9 (H) 81.4 - 97.8 fL    MCH 33.3 (H) 27.0 - 33.0 pg    MCHC 34.0 33.7 - 35.3 g/dL    RDW 47.2 35.9 - 50.0 fL    Platelet Count 261 164 - 446 K/uL    " MPV 10.7 9.0 - 12.9 fL    Neutrophils-Polys 67.20 44.00 - 72.00 %    Lymphocytes 20.50 (L) 22.00 - 41.00 %    Monocytes 6.40 0.00 - 13.40 %    Eosinophils 4.70 0.00 - 6.90 %    Basophils 0.60 0.00 - 1.80 %    Immature Granulocytes 0.60 0.00 - 0.90 %    Nucleated RBC 0.00 /100 WBC    Neutrophils (Absolute) 7.27 1.82 - 7.42 K/uL    Lymphs (Absolute) 2.22 1.00 - 4.80 K/uL    Monos (Absolute) 0.69 0.00 - 0.85 K/uL    Eos (Absolute) 0.51 0.00 - 0.51 K/uL    Baso (Absolute) 0.07 0.00 - 0.12 K/uL    Immature Granulocytes (abs) 0.06 0.00 - 0.11 K/uL    NRBC (Absolute) 0.00 K/uL   Basic Metabolic Panel (BMP)   Result Value Ref Range    Sodium 138 135 - 145 mmol/L    Potassium 3.5 (L) 3.6 - 5.5 mmol/L    Chloride 104 96 - 112 mmol/L    Co2 26 20 - 33 mmol/L    Glucose 153 (H) 65 - 99 mg/dL    Bun 13 8 - 22 mg/dL    Creatinine 1.39 0.50 - 1.40 mg/dL    Calcium 8.7 8.4 - 10.2 mg/dL    Anion Gap 8.0 0.0 - 11.9   Btype Natriuretic Peptide   Result Value Ref Range    B Natriuretic Peptide 20 0 - 100 pg/mL   Troponin STAT   Result Value Ref Range    Troponin I <0.02 0.00 - 0.04 ng/mL   PT/INR   Result Value Ref Range    PT 12.2 12.0 - 14.6 sec    INR 0.92 0.87 - 1.13   APTT   Result Value Ref Range    APTT 33.2 24.7 - 36.0 sec   ESTIMATED GFR   Result Value Ref Range    GFR If African American >60 >60 mL/min/1.73 m 2    GFR If Non  51 (A) >60 mL/min/1.73 m 2   EKG (ER)   Result Value Ref Range    Report       [facility] Emergency Dept.    Test Date:  2017  Pt Name:    YOVANY MEHTA                  Department: EDSM  MRN:        [id number]                      Room:  Gender:     M                            Technician: [id number]  :        1950                   Requested By:ER TRIAGE PROTOCOL  Order #:    [id number]                    Reading MD: KIMMY SHAIKH MD    Measurements  Intervals                                Axis  Rate:       72                           P:           -12  OH:         143                          QRS:        -34  QRSD:       141                          T:          14  QT:         405  QTc:        444    Interpretive Statements  Sinus rhythm  Right bundle branch block  Baseline wander in lead(s) V6  Compared to ECG 10/19/2014 15:58:51  Left anterior fascicular block no longer present    Electronically Signed On 2017 12:08:43 PDT by KIMMY SHAIKH MD     EKG (ER)   Result Value Ref Range    Report       [facility] Emergency Dept.    Test Date:  2017  Pt Name:    YOVANY MEHTA                  Department: EDSM  MRN:        [id number]                      [facility]  Gender:     M                            Technician: [id number]  :        1950                   Requested By:KIMMY SHAIKH  Order #:    [id number]                    Reading MD: KIMMY SHAIKH MD    Measurements  Intervals                                Axis  Rate:       70                           P:          -8  OH:         145                          QRS:        -33  QRSD:       140                          T:          11  QT:         425  QTc:        459    Interpretive Statements  Sinus rhythm  Right bundle branch block  Compared to ECG 2017 09:26:53  No significant changes    Electronically Signed On 2017 12:08:50 PDT by KIMMY SHAIKH MD           COURSE & MEDICAL DECISION MAKING  Pertinent Labs & Imaging studies reviewed. (See chart for details)    The patient presents today with shortness of breath. He reports a history of COPD. However looking at his medication list seems that his treatment would be suboptimal. I will say that he is not very wheezy in the emergency department. He does have diminished breath sounds throughout. The patient a trial of a nebulizer treatment here in the emergency department. An IV is established. Laboratory studies and EKG are obtained.      EKGs are remarkable for right bundle branch block.  Laboratory studies remarkable for normal troponin. White blood cell count is normal. Chest x-ray is obtained which is remarkable for cardiomegaly but no evidence of infiltrate.    While in the emergency department patient has no chest pain. EKG is obtained at that time and results are as above. There is no acute evidence of ischemia. I feel the patient requires admission hospital for further evaluation of his chest pain and dyspnea. I spoke with the on-call hospitalist for admission.    FINAL IMPRESSION  1. Acute chest pain    2. Shortness of breath    3. Abnormal EKG              Electronically signed by: Nathanael Sainz, 4/13/2017 12:11 PM

## 2017-04-13 NOTE — IP AVS SNAPSHOT
" Home Care Instructions                                                                                                                  Name:Edwin Gregorio  Medical Record Number:[id number]  CSN: [id number]    YOB: 1950   Age: 66 y.o.  Sex: male  HT:1.727 m (5' 8\") WT: 78.9 kg (173 lb 15.1 oz)          Admit Date: 4/13/2017     Discharge Date:   Today's Date: 4/14/2017  Attending Doctor:  Lorenzo Duong D.O.                  Allergies:  Augmentin            Discharge Instructions       Discharge Instructions    Discharged to home by car with relative. Discharged via wheelchair, hospital escort: Yes.  Special equipment needed: Not Applicable    Be sure to schedule a follow-up appointment with your primary care doctor or any specialists as instructed.     Discharge Plan:   Diet Plan: Discussed  Activity Level: Discussed  Smoking Cessation Offered: Patient Counseled  Confirmed Follow up Appointment: Appointment Scheduled  Confirmed Symptoms Management: Discussed  Influenza Vaccine Indication: Patient Refuses    I understand that a diet low in cholesterol, fat, and sodium is recommended for good health. Unless I have been given specific instructions below for another diet, I accept this instruction as my diet prescription.   Other diet: Regular diet    Special Instructions: None    · Is patient discharged on Warfarin / Coumadin?   No     · Is patient Post Blood Transfusion?  No    Depression / Suicide Risk    As you are discharged from this [facility] facility, it is important to learn how to keep safe from harming yourself.    Recognize the warning signs:  · Abrupt changes in personality, positive or negative- including increase in energy   · Giving away possessions  · Change in eating patterns- significant weight changes-  positive or negative  · Change in sleeping patterns- unable to sleep or sleeping all the time   · Unwillingness or inability to communicate  · Depression  · Unusual sadness, " discouragement and loneliness  · Talk of wanting to die  · Neglect of personal appearance   · Rebelliousness- reckless behavior  · Withdrawal from people/activities they love  · Confusion- inability to concentrate     If you or a loved one observes any of these behaviors or has concerns about self-harm, here's what you can do:  · Talk about it- your feelings and reasons for harming yourself  · Remove any means that you might use to hurt yourself (examples: pills, rope, extension cords, firearm)  · Get professional help from the community (Mental Health, Substance Abuse, psychological counseling)  · Do not be alone:Call your Safe Contact- someone whom you trust who will be there for you.  · Call your local CRISIS HOTLINE 951-3136 or 378-303-8735  · Call your local Children's Mobile Crisis Response Team [place] (898) 086-1174 or www.[employer]  · Call the toll free National Suicide Prevention Hotlines   · National Suicide Prevention Lifeline 545-104-YKUW (0660)  · National Hope Line Network 800-SUICIDE (768-7441)    Shortness of Breath  Shortness of breath means you have trouble breathing. It could also mean that you have a medical problem. You should get immediate medical care for shortness of breath.  CAUSES   · Not enough oxygen in the air such as with high altitudes or a smoke-filled room.  · Certain lung diseases, infections, or problems.  · Heart disease or conditions, such as angina or heart failure.  · Low red blood cells (anemia).  · Poor physical fitness, which can cause shortness of breath when you exercise.  · Chest or back injuries or stiffness.  · Being overweight.  · Smoking.  · Anxiety, which can make you feel like you are not getting enough air.  DIAGNOSIS   Serious medical problems can often be found during your physical exam. Tests may also be done to determine why you are having shortness of breath. Tests may include:  2. Chest X-rays.  3. Lung function tests.  4. Blood tests.  5. An  electrocardiogram (ECG).  6. An ambulatory electrocardiogram. An ambulatory ECG records your heartbeat patterns over a 24-hour period.  7. Exercise testing.  8. A transthoracic echocardiogram (TTE). During echocardiography, sound waves are used to evaluate how blood flows through your heart.  9. A transesophageal echocardiogram (SEE).  10. Imaging scans.  Your health care provider may not be able to find a cause for your shortness of breath after your exam. In this case, it is important to have a follow-up exam with your health care provider as directed.   TREATMENT   Treatment for shortness of breath depends on the cause of your symptoms and can vary greatly.  HOME CARE INSTRUCTIONS   2. Do not smoke. Smoking is a common cause of shortness of breath. If you smoke, ask for help to quit.  3. Avoid being around chemicals or things that may bother your breathing, such as paint fumes and dust.  4. Rest as needed. Slowly resume your usual activities.  5. If medicines were prescribed, take them as directed for the full length of time directed. This includes oxygen and any inhaled medicines.  6. Keep all follow-up appointments as directed by your health care provider.  SEEK MEDICAL CARE IF:   2. Your condition does not improve in the time expected.  3. You have a hard time doing your normal activities even with rest.  4. You have any new symptoms.  SEEK IMMEDIATE MEDICAL CARE IF:   2. Your shortness of breath gets worse.  3. You feel light-headed, faint, or develop a cough not controlled with medicines.  4. You start coughing up blood.  5. You have pain with breathing.  6. You have chest pain or pain in your arms, shoulders, or abdomen.  7. You have a fever.  8. You are unable to walk up stairs or exercise the way you normally do.  MAKE SURE YOU:  · Understand these instructions.  · Will watch your condition.  · Will get help right away if you are not doing well or get worse.     This information is not intended to replace  advice given to you by your health care provider. Make sure you discuss any questions you have with your health care provider.     Document Released: 09/12/2002 Document Revised: 12/23/2014 Document Reviewed: 03/04/2013  [employer] Interactive Patient Education ©2016 [employer]      Your appointments     Apr 22, 2017  1:00 PM   CT BODY WITH with [facility] CT 2   [facility] [place]    [address]  [place] [address] [phone]           Taking medications as regularly scheduled is strongly encouraged.  *For Abdominal CT-Patient needs to  oral contrast and instruction from the department at least 2 hours prior to exam. Patient may  contrast at any imaging facility.              Follow-up Information     1. Follow up with Omar Griggs M.D.. Schedule an appointment as soon as possible for a visit in 1 week.    Specialty:  Family Medicine    Contact information    [address]  [place] [address]  [phone]           Discharge Medication Instructions:    Below are the medications your physician expects you to take upon discharge:    Review all your home medications and newly ordered medications with your doctor and/or pharmacist. Follow medication instructions as directed by your doctor and/or pharmacist.    Please keep your medication list with you and share with your physician.               Medication List      START taking these medications        Instructions    Morning Afternoon Evening Bedtime    levofloxacin 750 MG tablet   Last time this was given:  750 mg on 4/14/2017  8:00 AM   Commonly known as:  LEVAQUIN        Take 1 Tab by mouth every day for 6 days.   Dose:  750 mg                          CONTINUE taking these medications        Instructions    Morning Afternoon Evening Bedtime    atorvastatin 20 MG Tabs   Last time this was given:  40 mg on 4/13/2017  8:04 PM   Commonly known as:  LIPITOR        Take 40 mg by mouth every evening.   Dose:  40  mg                        azithromycin 250 MG Tabs   Commonly known as:  ZITHROMAX        Take 250-500 mg by mouth every day. Pt started a 5 day course on 4/4   Dose:  250-500 mg                        clindamycin 300 MG Caps   Commonly known as:  CLEOCIN        Take 300 mg by mouth 4 times a day. Pt started a 10 day course on 3/3   Dose:  300 mg                        fish oil 1000 MG Caps capsule   Last time this was given:  1,000 mg on 4/14/2017  8:00 AM        Take 1,000 mg by mouth every day.   Dose:  1000 mg                        lansoprazole 30 MG Cpdr   Commonly known as:  PREVACID        Take 30 mg by mouth every evening.   Dose:  30 mg                        morphine ER 15 MG Tbcr tablet   Last time this was given:  15 mg on 4/14/2017  7:59 AM   Commonly known as:  MS CONTIN        Take 15 mg by mouth every 12 hours.   Dose:  15 mg                        MULTI VITAMIN DAILY PO        Take 1 Tab by mouth every day.   Dose:  1 Tab                        olanzapine 5 MG Tabs   Last time this was given:  5 mg on 4/13/2017  8:04 PM   Commonly known as:  ZYPREXA        Take 5 mg by mouth every evening.   Dose:  5 mg                        paroxetine 30 MG Tabs   Last time this was given:  4/14/2017  8:04 AM   Commonly known as:  PAXIL        Take 45 mg by mouth every day.   Dose:  45 mg                        sumatriptan 50 MG Tabs   Commonly known as:  IMITREX        Take 100 mg by mouth Once PRN.   Dose:  100 mg                        topiramate 100 MG Tabs   Last time this was given:  100 mg on 4/14/2017  8:00 AM   Commonly known as:  TOPAMAX        Take 100 mg by mouth 2 times a day.   Dose:  100 mg                        trazodone 100 MG Tabs   Last time this was given:  150 mg on 4/13/2017  8:04 PM   Commonly known as:  DESYREL        Take 150 mg by mouth every evening.   Dose:  150 mg                             Where to Get Your Medications      These medications were sent to \[facility]\"" [facility] #[id number] -  [place], [place] - [address]  [address] [place] [address]     Phone:  [phone]    - levofloxacin 750 MG tablet            Instructions           Diet / Nutrition:    Follow any diet instructions given to you by your doctor or the dietician, including how much salt (sodium) you are allowed each day.    If you are overweight, talk to your doctor about a weight reduction plan.    Activity:    Remain physically active following your doctor's instructions about exercise and activity.    Rest often.     Any time you become even a little tired or short of breath, SIT DOWN and rest.    Worsening Symptoms:    Report any of the following signs and symptoms to the doctor's office immediately:    *Pain of jaw, arm, or neck  *Chest pain not relieved by medication                               *Dizziness or loss of consciousness  *Difficulty breathing even when at rest   *More tired than usual                                       *Bleeding drainage or swelling of surgical site  *Swelling of feet, ankles, legs or stomach                 *Fever (>100ºF)  *Pink or blood tinged sputum  *Weight gain (3lbs/day or 5lbs /week)           *Shock from internal defibrillator (if applicable)  *Palpitations or irregular heartbeats                *Cool and/or numb extremities    Stroke Awareness    Common Risk Factors for Stroke include:    Age  Atrial Fibrillation  Carotid Artery Stenosis  Diabetes Mellitus  Excessive alcohol consumption  High blood pressure  Overweight   Physical inactivity  Smoking    Warning signs and symptoms of a stroke include:    *Sudden numbness or weakness of the face, arm or leg (especially on one side of the body).  *Sudden confusion, trouble speaking or understanding.  *Sudden trouble seeing in one or both eyes.  *Sudden trouble walking, dizziness, loss of balance or coordination.Sudden severe headache with no known cause.    It is very important to get treatment quickly when a stroke  occurs. If you experience any of the above warning signs, call 911 immediately.                   Disclaimer         Quit Smoking / Tobacco Use:    I understand the use of any tobacco products increases my chance of suffering from future heart disease or stroke and could cause other illnesses which may shorten my life. Quitting the use of tobacco products is the single most important thing I can do to improve my health. For further information on smoking / tobacco cessation call a Toll Free Quit Line at 1-564.620.8477 (*National Cancer [place]) or 1-249.606.3216 (American Lung Association) or you can access the web based program at www.lungusa.org.    [place] Tobacco Users Help Line:  (135) 872-1831       Toll Free: 1-916.294.6356  Quit Tobacco Program [facility] (490)344-3417    Crisis Hotline:    [place] Crisis Hotline:  8-614-WQMOWVS or 1-144.912.9911    [place] Crisis Hotline:    1-233.114.6418 or 440-697-2986    Discharge Survey:   Thank you for choosing [facility]. We hope we did everything we could to make your hospital stay a pleasant one. You may be receiving a phone survey and we would appreciate your time and participation in answering the questions. Your input is very valuable to us in our efforts to improve our service to our patients and their families.        My signature on this form indicates that:    1. I have reviewed and understand the above information.  2. My questions regarding this information have been answered to my satisfaction.  3. I have formulated a plan with my discharge nurse to obtain my prescribed medications for home.                  Disclaimer         __________________________________                     __________       ________                       Patient Signature                                                 Date                    Time

## 2017-04-13 NOTE — IP AVS SNAPSHOT
4/14/2017    Edwin Gregorio  [address]  [place] [address]    Dear Edwin:    [facility] wants to ensure your discharge home is safe and you or your loved ones have had all of your questions answered regarding your care after you leave the hospital.    Below is a list of resources and contact information should you have any questions regarding your hospital stay, follow-up instructions, or active medical symptoms.    Questions or Concerns Regarding… Contact   Medical Questions Related to Your Discharge  (7 days a week, 8am-5pm) Contact a Nurse Care Coordinator   [phone]   Medical Questions Not Related to Your Discharge  (24 hours a day / 7 days a week)  Contact the Nurse Health Line   [phone]    Medications or Discharge Instructions Refer to your discharge packet   or contact your [facility] Primary Care Provider   [phone]   Follow-up Appointment(s) Schedule your appointment via Kids [employer]izine   or contact Scheduling [phone]   Billing Review your statement via [employer]  or contact Billing [phone]   Medical Records Review your records via Kids [employer]izine   or contact Medical Records [phone]     You may receive a telephone call within two days of discharge. This call is to make certain you understand your discharge instructions and have the opportunity to have any questions answered. You can also easily access your medical information, test results and upcoming appointments via the Kids [employer]izine free online health management tool. You can learn more and sign up at A[employer]/Kids Quizine. For assistance setting up your [employer] account, please call [phone].    Once again, we want to ensure your discharge home is safe and that you have a clear understanding of any next steps in your care. If you have any questions or concerns, please do not hesitate to contact us, we are here for you. Thank you for choosing [facility] for your healthcare needs.    Sincerely,    Your [facility] Healthcare Team

## 2017-04-14 ENCOUNTER — PATIENT OUTREACH (OUTPATIENT)
Dept: HEALTH INFORMATION MANAGEMENT | Facility: OTHER | Age: 67
End: 2017-04-14

## 2017-04-14 VITALS
SYSTOLIC BLOOD PRESSURE: 144 MMHG | HEIGHT: 68 IN | BODY MASS INDEX: 26.36 KG/M2 | DIASTOLIC BLOOD PRESSURE: 77 MMHG | HEART RATE: 80 BPM | WEIGHT: 173.94 LBS | TEMPERATURE: 97.8 F | RESPIRATION RATE: 18 BRPM | OXYGEN SATURATION: 96 %

## 2017-04-14 PROBLEM — R06.02 SOB (SHORTNESS OF BREATH): Status: RESOLVED | Noted: 2017-04-13 | Resolved: 2017-04-14

## 2017-04-14 LAB
ANION GAP SERPL CALC-SCNC: 8 MMOL/L (ref 0–11.9)
BUN SERPL-MCNC: 16 MG/DL (ref 8–22)
CALCIUM SERPL-MCNC: 8.9 MG/DL (ref 8.4–10.2)
CHLORIDE SERPL-SCNC: 112 MMOL/L (ref 96–112)
CHOLEST SERPL-MCNC: 147 MG/DL (ref 100–199)
CO2 SERPL-SCNC: 22 MMOL/L (ref 20–33)
CREAT SERPL-MCNC: 1.1 MG/DL (ref 0.5–1.4)
GFR SERPL CREATININE-BSD FRML MDRD: >60 ML/MIN/1.73 M 2
GLUCOSE SERPL-MCNC: 115 MG/DL (ref 65–99)
HDLC SERPL-MCNC: 47 MG/DL
LDLC SERPL CALC-MCNC: 85 MG/DL
POTASSIUM SERPL-SCNC: 4 MMOL/L (ref 3.6–5.5)
SODIUM SERPL-SCNC: 142 MMOL/L (ref 135–145)
TRIGL SERPL-MCNC: 75 MG/DL (ref 0–149)

## 2017-04-14 PROCEDURE — 96376 TX/PRO/DX INJ SAME DRUG ADON: CPT

## 2017-04-14 PROCEDURE — 99406 BEHAV CHNG SMOKING 3-10 MIN: CPT

## 2017-04-14 PROCEDURE — 700111 HCHG RX REV CODE 636 W/ 250 OVERRIDE (IP): Performed by: INTERNAL MEDICINE

## 2017-04-14 PROCEDURE — 700105 HCHG RX REV CODE 258: Performed by: HOSPITALIST

## 2017-04-14 PROCEDURE — 700111 HCHG RX REV CODE 636 W/ 250 OVERRIDE (IP): Performed by: HOSPITALIST

## 2017-04-14 PROCEDURE — 700102 HCHG RX REV CODE 250 W/ 637 OVERRIDE(OP): Performed by: HOSPITALIST

## 2017-04-14 PROCEDURE — 80048 BASIC METABOLIC PNL TOTAL CA: CPT

## 2017-04-14 PROCEDURE — 99217 PR OBSERVATION CARE DISCHARGE: CPT | Performed by: HOSPITALIST

## 2017-04-14 PROCEDURE — 80061 LIPID PANEL: CPT

## 2017-04-14 PROCEDURE — G0378 HOSPITAL OBSERVATION PER HR: HCPCS

## 2017-04-14 PROCEDURE — A9270 NON-COVERED ITEM OR SERVICE: HCPCS | Performed by: HOSPITALIST

## 2017-04-14 PROCEDURE — 700101 HCHG RX REV CODE 250: Performed by: HOSPITALIST

## 2017-04-14 RX ORDER — LEVOFLOXACIN 750 MG/1
750 TABLET, FILM COATED ORAL DAILY
Qty: 6 TAB | Refills: 0 | Status: SHIPPED | OUTPATIENT
Start: 2017-04-14 | End: 2017-04-20

## 2017-04-14 RX ADMIN — PAROXETINE 45 MG: 10 TABLET, FILM COATED ORAL at 08:04

## 2017-04-14 RX ADMIN — Medication 1000 MG: at 08:00

## 2017-04-14 RX ADMIN — LEVOFLOXACIN 750 MG: 500 TABLET, FILM COATED ORAL at 08:00

## 2017-04-14 RX ADMIN — TOPIRAMATE 100 MG: 100 TABLET, FILM COATED ORAL at 08:00

## 2017-04-14 RX ADMIN — SODIUM CHLORIDE: 9 INJECTION, SOLUTION INTRAVENOUS at 06:16

## 2017-04-14 RX ADMIN — HEPARIN SODIUM 5000 UNITS: 5000 INJECTION, SOLUTION INTRAVENOUS; SUBCUTANEOUS at 06:16

## 2017-04-14 RX ADMIN — MORPHINE SULFATE 15 MG: 15 TABLET, EXTENDED RELEASE ORAL at 07:59

## 2017-04-14 RX ADMIN — HEPARIN SODIUM 5000 UNITS: 5000 INJECTION, SOLUTION INTRAVENOUS; SUBCUTANEOUS at 14:35

## 2017-04-14 RX ADMIN — LORAZEPAM 1 MG: 2 INJECTION INTRAMUSCULAR at 00:10

## 2017-04-14 RX ADMIN — NICOTINE 21 MG: 21 PATCH TRANSDERMAL at 06:16

## 2017-04-14 ASSESSMENT — PAIN SCALES - GENERAL
PAINLEVEL_OUTOF10: 0
PAINLEVEL_OUTOF10: 0

## 2017-04-14 ASSESSMENT — LIFESTYLE VARIABLES: EVER_SMOKED: YES

## 2017-04-14 NOTE — DISCHARGE PLANNING
Medical Social Work    Referral: Pt discussed at IDT rounds this AM.    Intervention: Per flowsheet, pt lives with spouse and expects to d.c home.  No SS needs identified nor any requests for VIKASH Wilkes during rounds.      Plan: SW available for any assistance with d.c planning.

## 2017-04-14 NOTE — PROGRESS NOTES
"Patient continues to be extremely restless and anxious. Per patient his wife \"made me come to the ER because I haven't been sleeping for the last week and have been keeping her awake\". Patient accidentally pulled out PIV, new PIV placed. MD contacted for additional orders. RN attempted to call patient's wife again with no answer, voicemail box remains full.   "

## 2017-04-14 NOTE — PROGRESS NOTES
Patient sleeping upon assessment, answers to verbal stimuli, but very sleepy difficult to arouse, wife at bedside, will come back to do full assessment when patient more awake

## 2017-04-14 NOTE — RESPIRATORY CARE
"COPD EDUCATION by COPD CLINICAL EDUCATOR  4/14/2017  at  10:15 AM by Sehrry Howard     Patient interviewed by COPD education team.  Patient unable to participate in full program.  Short intervention was completed with this patient and his caregiver covering: What is COPD (how the lungs work), daily medications rescue and maintenance, breathing techniques, infection prevention and oxygen safety were covered in detail.  A comprehensive packet including information about COPD, treatments, and oxygen safety was given.       Smoking Cessation Intervention 3 -10 minutes completed.     Provided smoking cessation packet with \"Tips to Quit\" and flyer for \"Free Smoking Cessation Classes\". Provided \"Quit Card\" with the phone number to [facility] for free nicotine replacement therapy.   Provided coupon for [employer].      "

## 2017-04-14 NOTE — PROGRESS NOTES
Tele Summary @ 6379    Rhythm : Sinus Rhythm, Right BBB  Ectopy : frequent PVC  HR : 72  HI : 0.18  QRS : 0.16  QT : 0.40    Any further monitoring will be done by patient's Primary Nurse

## 2017-04-14 NOTE — CARE PLAN
Problem: Safety  Goal: Will remain free from falls  Patient educated regarding fall precautions and verbalized understanding. Non slip socks on patient. Bed alarm is on. Patient verbalized understanding regarding use of call light for assistance. Mobility assessed and proper sign placed on door.     Problem: Knowledge Deficit  Goal: Knowledge of disease process/condition, treatment plan, diagnostic tests, and medications will improve  POC discussed with patient. All questions answered. Patient verbalized understanding.

## 2017-04-14 NOTE — PROGRESS NOTES
"Despite being mediated per MAR with PRN and home medication, patient continues to be extremely restless and anxious stating that he \"can't sleep\". Page out to MD.  "

## 2017-04-14 NOTE — H&P
PRIMARY CARE:  Dr. Omar Griggs    CHIEF COMPLAINT:  Shortness of breath.    HISTORY OF PRESENT ILLNESS:  The patient is a 66-year-old gentleman with   history of multiple medical problems including COPD, chronic pain syndrome and   questionable dementia.  Patient now presents with complaints of shortness of   breath that has been going on for the past 2 weeks.  Patient states that this   was accompanied by cough, but no worse than usual.  Patient denied any   productive sputum.  Patient denied ill contacts, fevers or chills associated   with this episode.  Patient denied any nausea, vomiting, diarrhea.  Patient   denied any chest pain.  However, patient did acknowledge that he had chest   pain to the ER physician.    PAST MEDICAL HISTORY:  COPD, dental abscess, chest pain workup in 2014 with   negative findings.  Suspicion of early dementia, hypertension, depression, chronic pain syndrome.    PAST SURGICAL HISTORY:  Significant for incision and drainage of the skin   abscess.    FAMILY HISTORY:  Significant for colon cancer, heart disease.    SOCIAL HISTORY:  Has long history of tobacco dependence with pack per day.    Unknown duration.  Denied IV drug use or alcohol.    ALLERGIES:  AUGMENTIN.    MEDICATIONS:  _____ 40 mg at bedtime, azithromycin 250/500 mg daily,   clindamycin 200 mg q.i.d., lansoprazole 30 mg daily, MS Contin 15 mg q.12   hours, multivitamin 1 tab daily, olanzapine 5 mg at bedtime, omega-3 fatty   acid 1000 mg daily, paroxetine 40 mg daily, Sumatriptan 100 mg p.r.n. daily,   topiramate 100 mg b.i.d., trazodone 150 mg at bedtime.    REVIEW OF SYSTEMS:  Patient has been in his usual state of health.  No ill   encounters.  No upper respiratory symptoms of sore throat, but positive for   cough.  No positive sputum.  No fevers, chills, nausea, vomiting, diarrhea.    No focal neurologic complaint.  No rashes or ulcerations.    PHYSICAL EXAMINATION:  GENERAL APPEARANCE:  Well-developed white male in no  acute distress.  VITAL SIGNS:  Temperature 35.8 C, heart rate 82, respirations 16, blood   pressure 116/82, saturating 97% on 2 liters nasal cannula.  HEENT:  Normocephalic, atraumatic.  Pupils are equal, round and reactive to   light, anicteric sclerae.  Extraocular muscles intact.  NECK:  No cervical lymphadenopathy appreciated.  Oropharynx is small with   Mallampatti score of III.  Mucosa is moist, clear without ulcerations or   exudates.  PULMONARY:  Clear to auscultation bilaterally.  CARDIOVASCULAR:  Regular rate and rhythm without murmurs, rubs or gallops.  ABDOMEN:  Positive bowel sounds, soft, nontender, nondistended.  EXTREMITIES:  No clubbing, cyanosis or edema.  NEUROLOGIC:  Cranial nerves II-XII intact.  SKIN:  No erythema or ulcerations.    LABORATORY:  WBC of 10.8, hemoglobin 14.2, platelet 261.  Sodium 138,   potassium 3.5, chloride 104, bicarbonate 26, BUN 13, creatinine 1.39, glucose   of 153, calcium 8.7.  Troponin is negative x2.  BNP of 20.  PT of 12.2, INR   0.92, PTT of 33.2.    EKG shows right bundle-branch block.  Chest x-ray shows cardiomegaly.    Telemetry monitoring shows variable heart rate with some bradycardia and   ectopy.    ASSESSMENT:  1.  A 66-year-old gentleman with history of chronic obstructive pulmonary   disease, chronic pain syndrome, now with findings of probable chronic   obstructive pulmonary disease exacerbation and acute renal failure.  Plan:    Chronic obstructive pulmonary disease exacerbation:  We will provide   supplementary oxygen, respiratory protocol, incentive spirometry, Pneumovax   and Fluvax as appropriate.  We will also appropriate hydration.  2.  Acute renal failure:  Follow with normal saline infusion.  3.  Hypokalemia:  We will replete and check magnesium level.  4.  Concern for insomnia:  Recommend outpatient followup.  5.  Questionable chest pain:  We will monitor patient on telemetry.  He had   been ruled out for acute myocardial infarction.  No  further workup will be   done as his stress test as of 2014 was negative.  6.  Questionable dementia:  We will minimize narcotics and sedatives when   possible.  7.  Chronic pain syndrome:  Provide judicious use of narcotics.  8.  Stable issues:  Medical history including depression, hypertension, and   dental abscess.  9.  Preventives:  Provide incentive spirometry, Pneumovax and Fluvax as   appropriate, stool softener and DVT prophylaxis.    DISPOSITION:  Complex and fair.       ____________________________________     MD LEANDRA LUND / CLAUDIO    DD:  04/13/2017 15:35:38  DT:  04/13/2017 19:08:34    D#:  [id number]  Job#:  [id number]    cc: RAJINDER CRUMP MD

## 2017-04-14 NOTE — PROGRESS NOTES
RN attempted to call patient's wife per request. One phone number is disconnected and the other went straight to voicemail and RN unable to leave message as mailbox was full.

## 2017-04-15 NOTE — DISCHARGE SUMMARY
PRIMARY CARE:  Omar Griggs MD    ADMITTING PHYSICIAN:  Scot Chang MD    DISCHARGING PHYSICIAN:  Lorenzo Duong DO.    DISCHARGE DIAGNOSES:  1.  Chronic obstructive pulmonary disease exacerbation.  2.  Acute renal insufficiency with improvement.  3.  Hypokalemia.  4.  Short-term memory difficulties with concern of questionable dementia.  5.  Chronic back pain.  6.  History of depression.  7.  Hypertension.    IMAGING AND PROCEDURES:  Echocardiogram on 04/13/2017 showed ejection fraction   of 65%, normal left ventricular size, thickness, systolic function, and   diastolic function.  Mild aortic sclerosis without stenosis.  Chest x-ray   04/13/2017 showed cardiomegaly without evidence of pulmonary edema.    COURSE OF HOSPITALIZATION:  Please refer to Dr. Scot Chang's history and   physical for further information and details.  In brief, this is a 66-year-old   male who came in with concern of shortness of breath.  He has worked up.  He   had some mild diminished breath sounds, but improvement with breathing   treatments and was found to be stable on date of discharge.  He was able to   ambulate without any significant hypoxia.  The patient's wife is at bedside at   the time of discharge.  We discussed discharge plans.    LABORATORY DATA:  His labs during course of hospitalization showed normal   white count, upper limits of normal at 10.8; hemoglobin 14.2, platelet count   was stable 261.  He had some mild hypokalemia on admission at 3.5, this did   correct at time the date of discharge.  Normal magnesium.  Total cholesterol   147, triglycerides 75, HDL 47, LDL of 85.  Troponins x3 were all less than   0.02.  BNP was normal.  The patient had improvement of his symptoms during   course of hospitalization.  He will follow up with Dr. Omar Griggs.    DISCHARGE MEDICATIONS:  1.  MS Contin 15 mg every 12 hours.  2.  Fish oil 1000 mg daily.  3.  Multivitamin daily.  4.  Prevacid 30 mg daily.  5.  Topamax 100 mg twice a  day.  6.  Paxil _____ daily.  7.  Imitrex 100 mg as needed daily for headache.  8.  Trazodone 150 mg every evening.  9.  Lipitor 40 mg every evening.  10.  Zyprexa 5 mg every evening.  11.  Levaquin, he will complete course of Levaquin for 6 days, 750 mg oral   daily.       ____________________________________     DO ROSI RICHARDS / CLAUDIO    DD:  04/15/2017 08:03:28  DT:  04/15/2017 09:50:02    D#:  [id number]  Job#:  [id number]    cc: RAJINDER CRUMP MD

## 2017-04-22 ENCOUNTER — HOSPITAL ENCOUNTER (OUTPATIENT)
Dept: RADIOLOGY | Facility: MEDICAL CENTER | Age: 67
End: 2017-04-22
Attending: FAMILY MEDICINE
Payer: MEDICARE

## 2017-04-22 DIAGNOSIS — R91.1 COIN LESION: ICD-10-CM

## 2017-04-22 PROCEDURE — 700117 HCHG RX CONTRAST REV CODE 255: Performed by: FAMILY MEDICINE

## 2017-04-22 PROCEDURE — 71260 CT THORAX DX C+: CPT

## 2017-04-22 RX ADMIN — IOHEXOL 75 ML: 350 INJECTION, SOLUTION INTRAVENOUS at 12:56

## 2017-08-19 ENCOUNTER — HOSPITAL ENCOUNTER (EMERGENCY)
Facility: MEDICAL CENTER | Age: 67
End: 2017-08-19
Attending: EMERGENCY MEDICINE
Payer: MEDICARE

## 2017-08-19 ENCOUNTER — APPOINTMENT (OUTPATIENT)
Dept: RADIOLOGY | Facility: MEDICAL CENTER | Age: 67
End: 2017-08-19
Attending: EMERGENCY MEDICINE
Payer: MEDICARE

## 2017-08-19 VITALS
RESPIRATION RATE: 20 BRPM | TEMPERATURE: 98.6 F | SYSTOLIC BLOOD PRESSURE: 86 MMHG | WEIGHT: 165 LBS | HEIGHT: 68 IN | DIASTOLIC BLOOD PRESSURE: 63 MMHG | BODY MASS INDEX: 25.01 KG/M2 | OXYGEN SATURATION: 96 % | HEART RATE: 76 BPM

## 2017-08-19 DIAGNOSIS — E86.0 DEHYDRATION: ICD-10-CM

## 2017-08-19 DIAGNOSIS — R41.0 CONFUSION: ICD-10-CM

## 2017-08-19 LAB
ALBUMIN SERPL BCP-MCNC: 4 G/DL (ref 3.2–4.9)
ALBUMIN/GLOB SERPL: 1.3 G/DL
ALP SERPL-CCNC: 93 U/L (ref 30–99)
ALT SERPL-CCNC: 29 U/L (ref 2–50)
AMPHET UR QL SCN: NEGATIVE
ANION GAP SERPL CALC-SCNC: 8 MMOL/L (ref 0–11.9)
APPEARANCE UR: CLEAR
AST SERPL-CCNC: 25 U/L (ref 12–45)
BACTERIA #/AREA URNS HPF: NEGATIVE /HPF
BARBITURATES UR QL SCN: NEGATIVE
BASOPHILS # BLD AUTO: 0.7 % (ref 0–1.8)
BASOPHILS # BLD: 0.1 K/UL (ref 0–0.12)
BENZODIAZ UR QL SCN: NEGATIVE
BILIRUB SERPL-MCNC: 0.3 MG/DL (ref 0.1–1.5)
BILIRUB UR QL STRIP.AUTO: NEGATIVE
BNP SERPL-MCNC: 19 PG/ML (ref 0–100)
BUN SERPL-MCNC: 15 MG/DL (ref 8–22)
BZE UR QL SCN: NEGATIVE
CALCIUM SERPL-MCNC: 9.3 MG/DL (ref 8.5–10.5)
CANNABINOIDS UR QL SCN: NEGATIVE
CHLORIDE SERPL-SCNC: 115 MMOL/L (ref 96–112)
CO2 SERPL-SCNC: 18 MMOL/L (ref 20–33)
COLOR UR: ABNORMAL
CREAT SERPL-MCNC: 1.27 MG/DL (ref 0.5–1.4)
EKG IMPRESSION: NORMAL
EOSINOPHIL # BLD AUTO: 0.25 K/UL (ref 0–0.51)
EOSINOPHIL NFR BLD: 1.9 % (ref 0–6.9)
EPI CELLS #/AREA URNS HPF: NEGATIVE /HPF
ERYTHROCYTE [DISTWIDTH] IN BLOOD BY AUTOMATED COUNT: 45.2 FL (ref 35.9–50)
GFR SERPL CREATININE-BSD FRML MDRD: 57 ML/MIN/1.73 M 2
GLOBULIN SER CALC-MCNC: 3 G/DL (ref 1.9–3.5)
GLUCOSE BLD-MCNC: 108 MG/DL (ref 65–99)
GLUCOSE SERPL-MCNC: 114 MG/DL (ref 65–99)
GLUCOSE UR STRIP.AUTO-MCNC: NEGATIVE MG/DL
HCT VFR BLD AUTO: 41.8 % (ref 42–52)
HGB BLD-MCNC: 14.1 G/DL (ref 14–18)
HYALINE CASTS #/AREA URNS LPF: ABNORMAL /LPF
IMM GRANULOCYTES # BLD AUTO: 0.09 K/UL (ref 0–0.11)
IMM GRANULOCYTES NFR BLD AUTO: 0.7 % (ref 0–0.9)
KETONES UR STRIP.AUTO-MCNC: NEGATIVE MG/DL
LEUKOCYTE ESTERASE UR QL STRIP.AUTO: ABNORMAL
LYMPHOCYTES # BLD AUTO: 1.43 K/UL (ref 1–4.8)
LYMPHOCYTES NFR BLD: 10.7 % (ref 22–41)
MCH RBC QN AUTO: 31.8 PG (ref 27–33)
MCHC RBC AUTO-ENTMCNC: 33.7 G/DL (ref 33.7–35.3)
MCV RBC AUTO: 94.1 FL (ref 81.4–97.8)
METHADONE UR QL SCN: NEGATIVE
MICRO URNS: ABNORMAL
MONOCYTES # BLD AUTO: 1.03 K/UL (ref 0–0.85)
MONOCYTES NFR BLD AUTO: 7.7 % (ref 0–13.4)
NEUTROPHILS # BLD AUTO: 10.47 K/UL (ref 1.82–7.42)
NEUTROPHILS NFR BLD: 78.3 % (ref 44–72)
NITRITE UR QL STRIP.AUTO: NEGATIVE
NRBC # BLD AUTO: 0 K/UL
NRBC BLD AUTO-RTO: 0 /100 WBC
OPIATES UR QL SCN: POSITIVE
OXYCODONE UR QL SCN: NEGATIVE
PCP UR QL SCN: NEGATIVE
PH UR STRIP.AUTO: 7 [PH]
PLATELET # BLD AUTO: 275 K/UL (ref 164–446)
PMV BLD AUTO: 10.8 FL (ref 9–12.9)
POTASSIUM SERPL-SCNC: 3.6 MMOL/L (ref 3.6–5.5)
PROPOXYPH UR QL SCN: NEGATIVE
PROT SERPL-MCNC: 7 G/DL (ref 6–8.2)
PROT UR QL STRIP: NEGATIVE MG/DL
RBC # BLD AUTO: 4.44 M/UL (ref 4.7–6.1)
RBC # URNS HPF: ABNORMAL /HPF
RBC UR QL AUTO: NEGATIVE
SODIUM SERPL-SCNC: 141 MMOL/L (ref 135–145)
SP GR UR STRIP.AUTO: 1.02
TROPONIN I SERPL-MCNC: 0.01 NG/ML (ref 0–0.04)
UROBILINOGEN UR STRIP.AUTO-MCNC: 1 MG/DL
WBC # BLD AUTO: 13.4 K/UL (ref 4.8–10.8)
WBC #/AREA URNS HPF: ABNORMAL /HPF

## 2017-08-19 PROCEDURE — 93005 ELECTROCARDIOGRAM TRACING: CPT

## 2017-08-19 PROCEDURE — 81001 URINALYSIS AUTO W/SCOPE: CPT | Mod: 59

## 2017-08-19 PROCEDURE — 96360 HYDRATION IV INFUSION INIT: CPT

## 2017-08-19 PROCEDURE — 87086 URINE CULTURE/COLONY COUNT: CPT

## 2017-08-19 PROCEDURE — 80307 DRUG TEST PRSMV CHEM ANLYZR: CPT

## 2017-08-19 PROCEDURE — 71010 DX-CHEST-PORTABLE (1 VIEW): CPT

## 2017-08-19 PROCEDURE — 700105 HCHG RX REV CODE 258: Performed by: EMERGENCY MEDICINE

## 2017-08-19 PROCEDURE — 80053 COMPREHEN METABOLIC PANEL: CPT

## 2017-08-19 PROCEDURE — 84484 ASSAY OF TROPONIN QUANT: CPT

## 2017-08-19 PROCEDURE — 85025 COMPLETE CBC W/AUTO DIFF WBC: CPT

## 2017-08-19 PROCEDURE — 83880 ASSAY OF NATRIURETIC PEPTIDE: CPT

## 2017-08-19 PROCEDURE — 87040 BLOOD CULTURE FOR BACTERIA: CPT

## 2017-08-19 PROCEDURE — 99284 EMERGENCY DEPT VISIT MOD MDM: CPT

## 2017-08-19 PROCEDURE — 82962 GLUCOSE BLOOD TEST: CPT

## 2017-08-19 RX ORDER — SODIUM CHLORIDE 9 MG/ML
1000 INJECTION, SOLUTION INTRAVENOUS ONCE
Status: COMPLETED | OUTPATIENT
Start: 2017-08-19 | End: 2017-08-19

## 2017-08-19 RX ADMIN — SODIUM CHLORIDE 1000 ML: 9 INJECTION, SOLUTION INTRAVENOUS at 17:34

## 2017-08-19 ASSESSMENT — LIFESTYLE VARIABLES: DO YOU DRINK ALCOHOL: NO

## 2017-08-19 NOTE — ED AVS SNAPSHOT
[email or web address] Access Code: [email or web address]  Expires: 8/21/2017  4:12 AM    SmartAngels.fr  A secure, online tool to manage your health information     [employer]s SmartAngels.fr® is a secure, online tool that connects you to your personalized health information from the privacy of your home -- day or night - making it very easy for you to manage your healthcare. Once the activation process is completed, you can even access your medical information using the SmartAngels.fr machelle, which is available for free in the Apple Machelle store or Google Play store.     [email or web address].fr provides the following levels of access (as shown below):   My Chart Features   [facility] Primary Care Doctor [facility]  Specialists [facility]  Urgent  Care Non-[facility]  Primary Care  Doctor   Email your healthcare team securely and privately 24/7 X X X X   Manage appointments: schedule your next appointment; view details of past/upcoming appointments X      Request prescription refills. X      View recent personal medical records, including lab and immunizations X X X X   View health record, including health history, allergies, medications X X X X   Read reports about your outpatient visits, procedures, consult and ER notes X X X X   See your discharge summary, which is a recap of your hospital and/or ER visit that includes your diagnosis, lab results, and care plan. X X       How to register for [email or web address]:  1. Go to  [email or web address].  2. Click on the Sign Up Now box, which takes you to the New Member Sign Up page. You will need to provide the following information:  a. Enter your [email or web address].fr Access Code exactly as it appears at the top of this page. (You will not need to use this code after you’ve completed the sign-up process. If you do not sign up before the expiration date, you must request a new code.)   b. Enter your date of birth.   c. Enter your home email address.   d. Click Submit, and follow the next screen’s instructions.  3. Create a SmartAngels.fr ID. This will be your [email or web address].fr  login ID and cannot be changed, so think of one that is secure and easy to remember.  4. Create a [employer] password. You can change your password at any time.  5. Enter your Password Reset Question and Answer. This can be used at a later time if you forget your password.   6. Enter your e-mail address. This allows you to receive e-mail notifications when new information is available in [employer].  7. Click Sign Up. You can now view your health information.    For assistance activating your [employer] account, call [phone]

## 2017-08-19 NOTE — ED AVS SNAPSHOT
8/19/2017    Edwin Gregorio  [address]  [place] [address]    Dear Edwin:    [facility] wants to ensure your discharge home is safe and you or your loved ones have had all of your questions answered regarding your care after you leave the hospital.    Below is a list of resources and contact information should you have any questions regarding your hospital stay, follow-up instructions, or active medical symptoms.    Questions or Concerns Regarding… Contact   Medical Questions Related to Your Discharge  (7 days a week, 8am-5pm) Contact a Nurse Care Coordinator   [phone]   Medical Questions Not Related to Your Discharge  (24 hours a day / 7 days a week)  Contact the Nurse Health Line   [phone]    Medications or Discharge Instructions Refer to your discharge packet   or contact your [facility] Primary Care Provider   [phone]   Follow-up Appointment(s) Schedule your appointment via [employer]   or contact Scheduling [phone]   Billing Review your statement via [employer]  or contact Billing [phone]   Medical Records Review your records via [employer]   or contact Medical Records [phone]     You may receive a telephone call within two days of discharge. This call is to make certain you understand your discharge instructions and have the opportunity to have any questions answered. You can also easily access your medical information, test results and upcoming appointments via the ClaimSync free online health management tool. You can learn more and sign up at [email or web address]. For assistance setting up your [employer]Sync account, please call [phone].    Once again, we want to ensure your discharge home is safe and that you have a clear understanding of any next steps in your care. If you have any questions or concerns, please do not hesitate to contact us, we are here for you. Thank you for choosing [facility] for your healthcare needs.    Sincerely,    Your [facility] Healthcare Team

## 2017-08-19 NOTE — ED AVS SNAPSHOT
Home Care Instructions                                                                                                                Edwin Gregorio   MRN: [id number]    Department:  [facility], Emergency Dept   Date of Visit:  8/19/2017            [facility], Emergency Dept    [address] [facility] [address]    Phone:  [phone]      You were seen by     Yovany Gray M.D.      Your Diagnosis Was     Confusion     R41.0       These are the medications you received during your hospitalization from 08/19/2017 1617 to 08/19/2017 2006     Date/Time Order Dose Route Action    08/19/2017 1734 NS infusion 1,000 mL 1,000 mL Intravenous New Bag      Follow-up Information     1. Follow up with Omar Griggs M.D.. Call in 2 days.    Specialty:  Family Medicine    Contact information    [address] [address] [phone]        Medication Information     Review all of your home medications and newly ordered medications with your primary doctor and/or pharmacist as soon as possible. Follow medication instructions as directed by your doctor and/or pharmacist.     Please keep your complete medication list with you and share with your physician. Update the information when medications are discontinued, doses are changed, or new medications (including over-the-counter products) are added; and carry medication information at all times in the event of emergency situations.               Medication List      ASK your doctor about these medications        Instructions    Morning Afternoon Evening Bedtime    atorvastatin 20 MG Tabs   Commonly known as:  LIPITOR        Take 40 mg by mouth every evening.   Dose:  40 mg                        fish oil 1000 MG Caps capsule        Take 1,000 mg by mouth every day.   Dose:  1000 mg                        lansoprazole 30 MG Cpdr   Commonly known as:  PREVACID        Take 30 mg by mouth every evening.   Dose:  30 mg                           morphine ER 15 MG Tbcr tablet   Commonly known as:  MS CONTIN        Take 15 mg by mouth every 12 hours.   Dose:  15 mg                        MULTI VITAMIN DAILY PO        Take 1 Tab by mouth every day.   Dose:  1 Tab                        olanzapine 5 MG Tabs   Commonly known as:  ZYPREXA        Take 5 mg by mouth every evening.   Dose:  5 mg                        paroxetine 30 MG Tabs   Commonly known as:  PAXIL        Take 60 mg by mouth every day.   Dose:  60 mg                        sumatriptan 50 MG Tabs   Commonly known as:  IMITREX        Take 100 mg by mouth Once PRN.   Dose:  100 mg                        topiramate 100 MG Tabs   Commonly known as:  TOPAMAX        Take 100 mg by mouth 2 times a day.   Dose:  100 mg                        trazodone 100 MG Tabs   Commonly known as:  DESYREL        Take 150 mg by mouth every evening.   Dose:  150 mg                                Procedures and tests performed during your visit     Procedure/Test Number of Times Performed    ACCU-CHECK 1    ACCU-CHEK GLUCOSE 1    BLOOD CULTURE 2    BTYPE NATRIURETIC PEPTIDE 1    CARDIAC MONITORING 2    CBC WITH DIFFERENTIAL 1    COMP METABOLIC PANEL 1    DX-CHEST-PORTABLE (1 VIEW) 1    EKG (ER) 1    ESTIMATED GFR 1    IV Saline Lock 1    O2 Protocol 1    OXYGEN THERAPY PER PROTOCOL 1    SALINE LOCK 1    TROPONIN 1    URINALYSIS 1    URINE CULTURE(NEW) 1    URINE DRUG SCREEN (TRIAGE) 1    URINE MICROSCOPIC (W/UA) 1        Discharge Instructions       Dehydration, Adult  Dehydration is when you lose more fluids from the body than you take in. Vital organs like the kidneys, brain, and heart cannot function without a proper amount of fluids and salt. Any loss of fluids from the body can cause dehydration.   CAUSES   · Vomiting.  · Diarrhea.  · Excessive sweating.  · Excessive urine output.  · Fever.  SYMPTOMS   Mild dehydration  · Thirst.  · Dry lips.  · Slightly dry mouth.  Moderate dehydration  · Very dry  mouth.  · Sunken eyes.  · Skin does not bounce back quickly when lightly pinched and released.  · Dark urine and decreased urine production.  · Decreased tear production.  · Headache.  Severe dehydration  · Very dry mouth.  · Extreme thirst.  · Rapid, weak pulse (more than 100 beats per minute at rest).  · Cold hands and feet.  · Not able to sweat in spite of heat and temperature.  · Rapid breathing.  · Blue lips.  · Confusion and lethargy.  · Difficulty being awakened.  · Minimal urine production.  · No tears.  DIAGNOSIS   Your caregiver will diagnose dehydration based on your symptoms and your exam. Blood and urine tests will help confirm the diagnosis. The diagnostic evaluation should also identify the cause of dehydration.  TREATMENT   Treatment of mild or moderate dehydration can often be done at home by increasing the amount of fluids that you drink. It is best to drink small amounts of fluid more often. Drinking too much at one time can make vomiting worse. Refer to the home care instructions below.  Severe dehydration needs to be treated at the hospital where you will probably be given intravenous (IV) fluids that contain water and electrolytes.  HOME CARE INSTRUCTIONS   · Ask your caregiver about specific rehydration instructions.  · Drink enough fluids to keep your urine clear or pale yellow.  · Drink small amounts frequently if you have nausea and vomiting.  · Eat as you normally do.  · Avoid:  ¨ Foods or drinks high in sugar.  ¨ Carbonated drinks.  ¨ Juice.  ¨ Extremely hot or cold fluids.  ¨ Drinks with caffeine.  ¨ Fatty, greasy foods.  ¨ Alcohol.  ¨ Tobacco.  ¨ Overeating.  ¨ Gelatin desserts.  · Wash your hands well to avoid spreading bacteria and viruses.  · Only take over-the-counter or prescription medicines for pain, discomfort, or fever as directed by your caregiver.  · Ask your caregiver if you should continue all prescribed and over-the-counter medicines.  · Keep all follow-up appointments  with your caregiver.  SEEK MEDICAL CARE IF:  · You have abdominal pain and it increases or stays in one area (localizes).  · You have a rash, stiff neck, or severe headache.  · You are irritable, sleepy, or difficult to awaken.  · You are weak, dizzy, or extremely thirsty.  SEEK IMMEDIATE MEDICAL CARE IF:   · You are unable to keep fluids down or you get worse despite treatment.  · You have frequent episodes of vomiting or diarrhea.  · You have blood or green matter (bile) in your vomit.  · You have blood in your stool or your stool looks black and tarry.  · You have not urinated in 6 to 8 hours, or you have only urinated a small amount of very dark urine.  · You have a fever.  · You faint.  MAKE SURE YOU:   · Understand these instructions.  · Will watch your condition.  · Will get help right away if you are not doing well or get worse.     This information is not intended to replace advice given to you by your health care provider. Make sure you discuss any questions you have with your health care provider.     Document Released: 12/18/2006 Document Revised: 03/11/2013 Document Reviewed: 08/06/2012  [employer] Interactive Patient Education ©2016 [employer]            Patient Information     Patient Information    Following emergency treatment: all patient requiring follow-up care must return either to a private physician or a clinic if your condition worsens before you are able to obtain further medical attention, please return to the emergency room.     Billing Information    [facility] we work to make the billing process streamlined for our patients.  Our Representatives are here to answer any questions you may have regarding your hospital bill.  If you have insurance coverage and have supplied your insurance information to us, we will submit a claim to your insurer on your behalf.  Should you have any questions regarding your bill, we can be reached online or by phone as follows:  Online: You are able  pay your bills online or live chat with our representatives about any billing questions you may have. We are here to help Monday - Friday from 8:00am to 7:30pm and 9:00am - 12:00pm on Saturdays.  Please visit [email or web address]  for more information.   Phone:  [phone] or [phone]    Please note that your emergency physician, surgeon, pathologist, radiologist, anesthesiologist, and other specialists are not employed by [facility] and will therefore bill separately for their services.  Please contact them directly for any questions concerning their bills at the numbers below:     Emer[facility] Physician Services:  [phone]  [facility]:  [phone]  [facility]:  [phone]  [facility]:  [phone]    1. Your final bill may vary from the amount quoted upon discharge if all procedures are not complete at that time, or if your doctor has additional procedures of which we are not aware. You will receive an additional bill if you return to the Emergency Department at [facility] for suture removal regardless of the facility of which the sutures were placed.     2. Please arrange for settlement of this account at the emergency registration.    3. All self-pay accounts are due in full at the time of treatment.  If you are unable to meet this obligation then payment is expected within 4-5 days.     4. If you have had radiology studies (CT, X-ray, Ultrasound, MRI), you have received a preliminary result during your emergency department visit. Please contact the radiology department [phone] to receive a copy of your final result. Please discuss the Final result with your primary physician or with the follow up physician provided.     Crisis Hotline:  [place] Crisis Hotline:  0-726-TIPTFJI or 1-458.160.4001  [place] Crisis Hotline:    1-519.302.8881 or 732-039-4365         ED Discharge Follow Up Questions    1. In order to  provide you with very good care, we would like to follow up with a phone call in the next few days.  May we have your permission to contact you?     YES /  NO    2. What is the best phone number to call you? (       )_____-__________    3. What is the best time to call you?      Morning  /  Afternoon  /  Evening                   Patient Signature:  ____________________________________________________________    Date:  ____________________________________________________________

## 2017-08-19 NOTE — ED NOTES
"Chief Complaint   Patient presents with   • ALOC     BP 86/63 mmHg  Pulse 114  Temp(Src) 37 °C (98.6 °F)  Resp 20  Ht 1.727 m (5' 7.99\")  Wt 74.844 kg (165 lb)  BMI 25.09 kg/m2  Patient found wandering neighborhood. Wife reported missing to [employer], left for three hours to get cigarettes, found wandering streets with ALOC. Family at bedside. Chart up for review by ERP.  "

## 2017-08-20 NOTE — DISCHARGE INSTRUCTIONS
Dehydration, Adult  Dehydration is when you lose more fluids from the body than you take in. Vital organs like the kidneys, brain, and heart cannot function without a proper amount of fluids and salt. Any loss of fluids from the body can cause dehydration.   CAUSES   · Vomiting.  · Diarrhea.  · Excessive sweating.  · Excessive urine output.  · Fever.  SYMPTOMS   Mild dehydration  · Thirst.  · Dry lips.  · Slightly dry mouth.  Moderate dehydration  · Very dry mouth.  · Sunken eyes.  · Skin does not bounce back quickly when lightly pinched and released.  · Dark urine and decreased urine production.  · Decreased tear production.  · Headache.  Severe dehydration  · Very dry mouth.  · Extreme thirst.  · Rapid, weak pulse (more than 100 beats per minute at rest).  · Cold hands and feet.  · Not able to sweat in spite of heat and temperature.  · Rapid breathing.  · Blue lips.  · Confusion and lethargy.  · Difficulty being awakened.  · Minimal urine production.  · No tears.  DIAGNOSIS   Your caregiver will diagnose dehydration based on your symptoms and your exam. Blood and urine tests will help confirm the diagnosis. The diagnostic evaluation should also identify the cause of dehydration.  TREATMENT   Treatment of mild or moderate dehydration can often be done at home by increasing the amount of fluids that you drink. It is best to drink small amounts of fluid more often. Drinking too much at one time can make vomiting worse. Refer to the home care instructions below.  Severe dehydration needs to be treated at the hospital where you will probably be given intravenous (IV) fluids that contain water and electrolytes.  HOME CARE INSTRUCTIONS   · Ask your caregiver about specific rehydration instructions.  · Drink enough fluids to keep your urine clear or pale yellow.  · Drink small amounts frequently if you have nausea and vomiting.  · Eat as you normally do.  · Avoid:  ¨ Foods or drinks high in sugar.  ¨ Carbonated  drinks.  ¨ Juice.  ¨ Extremely hot or cold fluids.  ¨ Drinks with caffeine.  ¨ Fatty, greasy foods.  ¨ Alcohol.  ¨ Tobacco.  ¨ Overeating.  ¨ Gelatin desserts.  · Wash your hands well to avoid spreading bacteria and viruses.  · Only take over-the-counter or prescription medicines for pain, discomfort, or fever as directed by your caregiver.  · Ask your caregiver if you should continue all prescribed and over-the-counter medicines.  · Keep all follow-up appointments with your caregiver.  SEEK MEDICAL CARE IF:  · You have abdominal pain and it increases or stays in one area (localizes).  · You have a rash, stiff neck, or severe headache.  · You are irritable, sleepy, or difficult to awaken.  · You are weak, dizzy, or extremely thirsty.  SEEK IMMEDIATE MEDICAL CARE IF:   · You are unable to keep fluids down or you get worse despite treatment.  · You have frequent episodes of vomiting or diarrhea.  · You have blood or green matter (bile) in your vomit.  · You have blood in your stool or your stool looks black and tarry.  · You have not urinated in 6 to 8 hours, or you have only urinated a small amount of very dark urine.  · You have a fever.  · You faint.  MAKE SURE YOU:   · Understand these instructions.  · Will watch your condition.  · Will get help right away if you are not doing well or get worse.     This information is not intended to replace advice given to you by your health care provider. Make sure you discuss any questions you have with your health care provider.     Document Released: 12/18/2006 Document Revised: 03/11/2013 Document Reviewed: 08/06/2012  [employer] Interactive Patient Education ©2016 [employer]

## 2017-08-20 NOTE — ED NOTES
Pt and wife educated on not smoking in hospital room/restrooms. Wife agrees to not encourage smoking in hospital

## 2017-08-20 NOTE — ED NOTES
ERP at bedside for plan of care update, discharge care. VSS, wife at bedside with pt during education, All lines and monitors DC'd.  Discharge instructions given, questions answered.  Instructed not to drive after pain meds and pt verbalizes understanding. Pt states all belongings in possession.  Escorted to lobby by this RN.

## 2017-08-20 NOTE — ED PROVIDER NOTES
ED Provider Note    HPI: Patient is a 66-year-old male who presented to the emergency department by ambulance transfer August 19, 2017 4:17 PM with a chief complaint of confusion.    Patient went for a walk today and apparently became lost. He was found wandering and he was somewhat altered. The patient is previously been diagnosed with possible dementia. On arrival here the patient was slow to respond but otherwise had no somatic complaints. He has not had fever chills or cough. He's had no vomiting. No other somatic complaints    Review of Systems: Cannot be obtained due to presenting condition    Past medical/surgical history: Depression dementia COPD    Medications: MS Contin Prevacid Topamax Paxil Imitrex trazodone Lipitor Zyprexa    Allergies: Augmentin    Social History: Patient smokes one pack of cigarettes per day no alcohol use      Physical exam: Constitutional: Elderly male appeared somewhat confused  Vital signs:  Temperature 98.6 pulse 114 respirations 20 blood pressure 86/63 pulse oximetry 90%  EYES: PERRL, EOMI, Conjunctivae and sclera normal, eyelids normal bilaterally.  Neck: Trachea midline. No cervical masses seen or palpated. Normal range of motion, supple. No meningeal signs elicited.  Cardiac: Regular rate and rhythm. S1-S2 present. No S3 or S4 present. No murmurs, rubs, or gallops heard. No edema or varicosities were seen.   Lungs: Clear to auscultation with good aeration throughout. No wheezes, rales, or rhonchi heard. Patient's chest wall moved symmetrically with each respiratory effort. Patient was not making use of accessory muscles of respiration in breathing.  Abdomen: Soft nontender to palpation. No rebound or guarding elicited. No organomegaly identified. No pulsatile abdominal masses identified.   Musculoskeletal:  no  pain with palpitation or movement of muscle, bone or joint , no obvious musculoskeletal deformities identified.  Neurologic: alert and awake answers questions  appropriately but slowly.. Moves all four extremities independently, no gross focal abnormalities identified. Normal strength and motor.  Skin: no rash or lesion seen, no palpable dermatologic lesions identified. Mucous membranes appeared somewhat dry  Psychiatric: not anxious, delusional, or hallucinating.    Medical decision making: EKG obtained per protocol (interpretation) is 12-lead EKG sinus tachycardia rate 115. Morphology P waves T waves unremarkable. The patient had QRS configuration consistent right bundle-branch block. No evidence of ST elevation or depression. Interpreted as unremarkable EKG for acute ischemia but abnormal due to the rate and palmar branch block finding    Chest x-ray obtained, if he is interstitial markings were noted. This could represent CHF.    Laboratory studies obtained (please see lab sheet for all results) significant findings included a normal BNP of 19, unremarkable urinalysis, slight white count of 13.4 by uncertain significance but no preponderance of immature granulocytes making systemic infection unlikely, bicarb of 18 consistent with mild dehydration    Patient was cautiously hydrated in the department. Repeat vital signs markedly improved with a pulse of 84 and a blood pressure 118/69 pulse oximetry 94% His mental status gradually improved. He is able to ambulate without any difficulty.Family will follow up with primary care provider for general care. The patient does have a history of presumed dementia and I suspect that he got dehydrated today as it was quite hot outside. The patient appears to be back at his baseline per his family. His chest x-ray is not completely normal but I'm uncertain of its significance given the normal BNP and white count. He'll follow-up with his primary care provider for general care.    Impression 1) confusion, resolved  2) dehydration

## 2017-08-20 NOTE — ED NOTES
Patient brought to restroom to urinate. While in the bathroom, patients wife brought him pack of cigarettes to smnoke in restroom. Cigarettes confiscated and brought to security

## 2017-08-20 NOTE — ED NOTES
Patient up at bedside attempting to provide urine sample. Could not provide a sample at this time.

## 2017-08-21 LAB
BACTERIA UR CULT: NORMAL
SIGNIFICANT IND 70042: NORMAL
SITE SITE: NORMAL
SOURCE SOURCE: NORMAL

## 2017-08-24 LAB
BACTERIA BLD CULT: NORMAL
BACTERIA BLD CULT: NORMAL
SIGNIFICANT IND 70042: NORMAL
SIGNIFICANT IND 70042: NORMAL
SITE SITE: NORMAL
SITE SITE: NORMAL
SOURCE SOURCE: NORMAL
SOURCE SOURCE: NORMAL

## 2017-11-13 ENCOUNTER — HOSPITAL ENCOUNTER (OUTPATIENT)
Dept: RADIOLOGY | Facility: MEDICAL CENTER | Age: 67
End: 2017-11-13
Attending: NURSE PRACTITIONER
Payer: MEDICARE

## 2017-11-13 DIAGNOSIS — M25.512 LEFT SHOULDER PAIN, UNSPECIFIED CHRONICITY: ICD-10-CM

## 2017-11-13 DIAGNOSIS — M25.552 LEFT HIP PAIN: ICD-10-CM

## 2017-11-13 PROCEDURE — 73030 X-RAY EXAM OF SHOULDER: CPT | Mod: LT

## 2017-11-13 PROCEDURE — 73501 X-RAY EXAM HIP UNI 1 VIEW: CPT | Mod: LT

## 2017-11-20 ENCOUNTER — APPOINTMENT (OUTPATIENT)
Dept: RADIOLOGY | Facility: IMAGING CENTER | Age: 67
End: 2017-11-20
Attending: FAMILY MEDICINE
Payer: MEDICARE

## 2017-11-20 ENCOUNTER — OFFICE VISIT (OUTPATIENT)
Dept: URGENT CARE | Facility: CLINIC | Age: 67
End: 2017-11-20
Payer: MEDICARE

## 2017-11-20 VITALS
DIASTOLIC BLOOD PRESSURE: 84 MMHG | OXYGEN SATURATION: 95 % | RESPIRATION RATE: 14 BRPM | HEART RATE: 90 BPM | TEMPERATURE: 98.3 F | HEIGHT: 67 IN | BODY MASS INDEX: 27.78 KG/M2 | WEIGHT: 177 LBS | SYSTOLIC BLOOD PRESSURE: 110 MMHG

## 2017-11-20 DIAGNOSIS — R05.9 COUGH: ICD-10-CM

## 2017-11-20 DIAGNOSIS — R06.02 SOB (SHORTNESS OF BREATH): ICD-10-CM

## 2017-11-20 DIAGNOSIS — J44.1 COPD EXACERBATION (HCC): ICD-10-CM

## 2017-11-20 PROCEDURE — 99214 OFFICE O/P EST MOD 30 MIN: CPT | Performed by: FAMILY MEDICINE

## 2017-11-20 PROCEDURE — 71020 DX-CHEST-2 VIEWS: CPT | Mod: TC | Performed by: FAMILY MEDICINE

## 2017-11-20 RX ORDER — METHYLPREDNISOLONE 4 MG/1
TABLET ORAL
Qty: 21 TAB | Refills: 0 | Status: SHIPPED | OUTPATIENT
Start: 2017-11-20 | End: 2018-04-13

## 2017-11-20 RX ORDER — LEVOFLOXACIN 500 MG/1
500 TABLET, FILM COATED ORAL DAILY
Qty: 3 TAB | Refills: 0 | Status: SHIPPED | OUTPATIENT
Start: 2017-11-20 | End: 2018-04-13

## 2017-11-21 NOTE — PROGRESS NOTES
HPI: Edwin Gregorio is a 67 y.o. male who presents with   Chief Complaint   Patient presents with   • Cough     x1mth, tired, lathorgic, spouse suspects pneumonia     Patient presents to urgent care with one month of fatigue productive cough. His wife who is also in healthcare is concerned about pneumonia he finished off 7 days of Levaquin about 2 days ago he's had no improvement in symptoms's perhaps even actually worsened them fevers or chills he has been using his inhalers as directed. He has not been on any steroid medication recently. No complaints of chest pain he's had some mild shortness of breath. No nausea vomiting or diarrhea.    Worsened by: activity, laying supine at night, first thing in the morning, when exposed to outside allergens  Improved by: OTC symptomatic medictions      PMH:  has a past medical history of Chronic airway obstruction, not elsewhere classified and Depression. He also has no past medical history of Hypertension.  MEDS:   Current Outpatient Prescriptions:   •  morphine ER (MS CONTIN) 15 MG Tab CR tablet, Take 15 mg by mouth every 12 hours., Disp: , Rfl:   •  Omega-3 Fatty Acids (FISH OIL) 1000 MG Cap capsule, Take 1,000 mg by mouth every day., Disp: , Rfl:   •  Multiple Vitamin (MULTI VITAMIN DAILY PO), Take 1 Tab by mouth every day., Disp: , Rfl:   •  lansoprazole (PREVACID) 30 MG CAPSULE DELAYED RELEASE, Take 30 mg by mouth every evening., Disp: , Rfl:   •  topiramate (TOPAMAX) 100 MG TABS, Take 100 mg by mouth 2 times a day., Disp: , Rfl:   •  paroxetine (PAXIL) 30 MG TABS, Take 60 mg by mouth every day., Disp: , Rfl:   •  trazodone (DESYREL) 100 MG TABS, Take 150 mg by mouth every evening., Disp: , Rfl:   •  atorvastatin (LIPITOR) 20 MG TABS, Take 40 mg by mouth every evening., Disp: , Rfl:   •  olanzapine (ZYPREXA) 5 MG TABS, Take 5 mg by mouth every evening., Disp: , Rfl:   •  sumatriptan (IMITREX) 50 MG TABS, Take 100 mg by mouth Once PRN., Disp: , Rfl:   ALLERGIES:  "  Allergies   Allergen Reactions   • Augmentin      SURGHX: No past surgical history on file.  SOCHX:  reports that he has been smoking Cigarettes.  He has been smoking about 1.00 pack per day. He does not have any smokeless tobacco history on file. He reports that he does not drink alcohol or use drugs.  FH: Family history was reviewed, no pertinent findings to report    PE:  Vitals /84   Pulse 90   Temp 36.8 °C (98.3 °F)   Resp 14   Ht 1.689 m (5' 6.5\")   Wt 80.3 kg (177 lb)   SpO2 95%   BMI 28.14 kg/m²    Gen AOx4, NAD  HEENT: moist mucus membranes, no pain or pressure with percussion of frontal, maxillary or ethmoid sinuses.  Bilateral conjunciva clear without erythema or exudate,  Bilateral TM's clear without bulge, fluid or loss of landmarks, no pharyngeal erythema or tonsillar exudate or tonsillar enlargement  Neck: supple, no cervical lymphadenopathy, no signs of menigismus  CV/PULM: RRR no murmurs,  rales ronchi and expiratory wheezes are present, no signs of resp distress  Abd soft nontender, bs present  Skin no rashes  Extremities -c/c/e  Neuro appropriate affect,     Diagnostics:  Bibasilar airspace opacities may represent atelectasis or pneumonitis.    Cardiomegaly.    Atherosclerotic plaque.   Reading Provider Reading Date   Swathi Hernandez M.D. Nov 20, 2017       A/P  Copd exacerbation  Medrol dosepak  3 more days of levaquin  ER prec given  "

## 2018-04-13 ENCOUNTER — OFFICE VISIT (OUTPATIENT)
Dept: URGENT CARE | Facility: CLINIC | Age: 68
End: 2018-04-13
Payer: MEDICARE

## 2018-04-13 ENCOUNTER — APPOINTMENT (OUTPATIENT)
Dept: RADIOLOGY | Facility: IMAGING CENTER | Age: 68
End: 2018-04-13
Attending: NURSE PRACTITIONER
Payer: MEDICARE

## 2018-04-13 VITALS
OXYGEN SATURATION: 96 % | SYSTOLIC BLOOD PRESSURE: 108 MMHG | HEART RATE: 82 BPM | TEMPERATURE: 98.4 F | WEIGHT: 175 LBS | DIASTOLIC BLOOD PRESSURE: 72 MMHG | RESPIRATION RATE: 12 BRPM | BODY MASS INDEX: 27.47 KG/M2 | HEIGHT: 67 IN

## 2018-04-13 DIAGNOSIS — N64.4 NIPPLE PAIN: ICD-10-CM

## 2018-04-13 DIAGNOSIS — R05.9 COUGH: ICD-10-CM

## 2018-04-13 DIAGNOSIS — J44.9 CHRONIC OBSTRUCTIVE PULMONARY DISEASE, UNSPECIFIED COPD TYPE (HCC): ICD-10-CM

## 2018-04-13 DIAGNOSIS — R06.02 SHORTNESS OF BREATH: ICD-10-CM

## 2018-04-13 DIAGNOSIS — Z71.6 TOBACCO ABUSE COUNSELING: ICD-10-CM

## 2018-04-13 PROCEDURE — 99214 OFFICE O/P EST MOD 30 MIN: CPT | Performed by: NURSE PRACTITIONER

## 2018-04-13 PROCEDURE — 71046 X-RAY EXAM CHEST 2 VIEWS: CPT | Mod: TC,FY | Performed by: NURSE PRACTITIONER

## 2018-04-13 RX ORDER — PREDNISONE 10 MG/1
40 TABLET ORAL DAILY
Qty: 20 TAB | Refills: 0 | Status: SHIPPED | OUTPATIENT
Start: 2018-04-13 | End: 2018-04-18

## 2018-04-13 RX ORDER — DOXYCYCLINE HYCLATE 100 MG
100 TABLET ORAL 2 TIMES DAILY
Qty: 14 TAB | Refills: 0 | Status: SHIPPED | OUTPATIENT
Start: 2018-04-13 | End: 2018-04-20

## 2018-04-13 ASSESSMENT — ENCOUNTER SYMPTOMS
COUGH: 1
WHEEZING: 0
SPUTUM PRODUCTION: 1
VOMITING: 0
SHORTNESS OF BREATH: 1
FEVER: 0
EYE PAIN: 0
NAUSEA: 0
DIZZINESS: 0
CHILLS: 0
MYALGIAS: 0
SORE THROAT: 0
LEG PAIN: 0

## 2018-04-13 ASSESSMENT — COPD QUESTIONNAIRES: COPD: 1

## 2018-04-13 NOTE — PROGRESS NOTES
Subjective:     Edwin Gregorio is a 67 y.o. male who presents for Cough (3 months, grey-clear mucus) and Chest Pressure  Patient presents to clinic today with complaints of ongoing cough has been on and off for the past 3 months. Patient feels chest tightness and pressure with a productive cough. Patient does have COPD and is concerned medication for his symptoms. Patient denies any chest pain, leg swelling.     Patient also complaining about pain of his left and right nipples. States that they feel very sensitive. Patient denies any abnormal masses or lumps. Patient denies any discharge from nipples.     Cough   This is a new problem. The current episode started more than 1 month ago. The problem has been unchanged. The problem occurs constantly. The cough is productive of sputum. Associated symptoms include shortness of breath. Pertinent negatives include no chest pain, chills, ear pain, fever, myalgias, nasal congestion, rash, sore throat or wheezing. Nothing aggravates the symptoms. Risk factors for lung disease include smoking/tobacco exposure. He has tried a beta-agonist inhaler for the symptoms. The treatment provided no relief. His past medical history is significant for COPD.   Shortness of Breath   This is a new problem. The current episode started more than 1 month ago. The problem occurs constantly. The problem has been unchanged. Associated symptoms include sputum production. Pertinent negatives include no chest pain, ear pain, fever, leg pain, leg swelling, rash, sore throat, vomiting or wheezing. Nothing aggravates the symptoms. Risk factors include smoking. He has tried beta agonist inhalers for the symptoms. The treatment provided no relief. His past medical history is significant for COPD.     Past Medical History:   Diagnosis Date   • Chronic airway obstruction, not elsewhere classified    • Depression    No past surgical history on file.  Social History     Social History   • Marital status:  "     Spouse name: N/A   • Number of children: N/A   • Years of education: N/A     Occupational History   • Not on file.     Social History Main Topics   • Smoking status: Current Every Day Smoker     Packs/day: 1.00     Types: Cigarettes   • Smokeless tobacco: Never Used   • Alcohol use No   • Drug use: No   • Sexual activity: No     Other Topics Concern   • Not on file     Social History Narrative   • No narrative on file    No family history on file. Review of Systems   Constitutional: Negative for chills and fever.   HENT: Negative for ear pain and sore throat.    Eyes: Negative for pain.   Respiratory: Positive for cough, sputum production and shortness of breath. Negative for wheezing.    Cardiovascular: Negative for chest pain and leg swelling.   Gastrointestinal: Negative for nausea and vomiting.   Genitourinary: Negative for hematuria.   Musculoskeletal: Negative for myalgias.   Skin: Negative for rash.   Neurological: Negative for dizziness.     Allergies   Allergen Reactions   • Augmentin       Objective:   /72   Pulse 82   Temp 36.9 °C (98.4 °F)   Resp 12   Ht 1.689 m (5' 6.5\")   Wt 79.4 kg (175 lb)   SpO2 96%   BMI 27.82 kg/m²   Physical Exam   Constitutional: He is oriented to person, place, and time. He appears well-developed and well-nourished. No distress.   HENT:   Head: Normocephalic and atraumatic.   Right Ear: Tympanic membrane normal.   Left Ear: Tympanic membrane normal.   Nose: Nose normal. Right sinus exhibits no maxillary sinus tenderness and no frontal sinus tenderness. Left sinus exhibits no maxillary sinus tenderness and no frontal sinus tenderness.   Mouth/Throat: Uvula is midline, oropharynx is clear and moist and mucous membranes are normal. No posterior oropharyngeal edema, posterior oropharyngeal erythema or tonsillar abscesses. No tonsillar exudate.   Eyes: Conjunctivae and EOM are normal. Pupils are equal, round, and reactive to light. Right eye exhibits no " discharge. Left eye exhibits no discharge.   Cardiovascular: Normal rate and regular rhythm.    No murmur heard.  Pulmonary/Chest: Effort normal. No respiratory distress. He has decreased breath sounds. He has no wheezes. He has no rhonchi. He has no rales. He exhibits no mass and no deformity. Right breast exhibits no inverted nipple, no mass, no nipple discharge and no skin change. Left breast exhibits tenderness. Left breast exhibits no inverted nipple, no mass, no nipple discharge and no skin change. Breasts are symmetrical.   Abdominal: Soft. He exhibits no distension. There is no tenderness.   Musculoskeletal: Normal range of motion.   Neurological: He is alert and oriented to person, place, and time. He has normal reflexes. No sensory deficit.   Skin: Skin is warm, dry and intact.   Psychiatric: He has a normal mood and affect.   Vitals reviewed.        Assessment/Plan:   Assessment    1. Chronic obstructive pulmonary disease, unspecified COPD type (CMS-[facility])  DX-CHEST-2 VIEWS    predniSONE (DELTASONE) 10 MG Tab    doxycycline (VIBRAMYCIN) 100 MG Tab   2. Cough  predniSONE (DELTASONE) 10 MG Tab    doxycycline (VIBRAMYCIN) 100 MG Tab   3. Shortness of breath  predniSONE (DELTASONE) 10 MG Tab    doxycycline (VIBRAMYCIN) 100 MG Tab   4. Tobacco abuse counseling     5. Nipple pain       Xray results  HEART: Not enlarged. There is atherosclerotic calcification in the aortic arch.  LUNGS: No areas of air space disease are demonstrated.  PLEURA: No effusion or pneumothorax.  No evidence of acute cardiopulmonary disease    Advised patient to follow up with PCP for evaluation of nipple sensitivity. Physical exam unremarkable no masses or lumps or abnormalities palpated.    Patient given precautionary s/sx that mandate immediate follow up and evaluation in the ED. Advised of risks of not doing so.    DDX, Supportive care, and indications for immediate follow-up discussed with patient.    Instructed to return to clinic  or nearest emergency department if we are not available for any change in condition, further concerns, or worsening of symptoms.    The patient demonstrated a good understanding and agreed with the treatment plan.

## 2018-05-12 ENCOUNTER — APPOINTMENT (OUTPATIENT)
Dept: RADIOLOGY | Facility: MEDICAL CENTER | Age: 68
End: 2018-05-12
Payer: MEDICARE

## 2018-05-12 ENCOUNTER — APPOINTMENT (OUTPATIENT)
Dept: RADIOLOGY | Facility: MEDICAL CENTER | Age: 68
End: 2018-05-12
Attending: NURSE PRACTITIONER
Payer: MEDICARE

## 2018-05-19 ENCOUNTER — APPOINTMENT (OUTPATIENT)
Dept: RADIOLOGY | Facility: MEDICAL CENTER | Age: 68
End: 2018-05-19
Attending: NURSE PRACTITIONER
Payer: MEDICARE

## 2018-05-20 ENCOUNTER — HOSPITAL ENCOUNTER (OUTPATIENT)
Dept: RADIOLOGY | Facility: MEDICAL CENTER | Age: 68
End: 2018-05-20
Attending: NURSE PRACTITIONER
Payer: MEDICARE

## 2018-05-20 DIAGNOSIS — M47.816 LUMBAR SPONDYLOSIS: ICD-10-CM

## 2018-05-20 PROCEDURE — 72148 MRI LUMBAR SPINE W/O DYE: CPT

## 2018-05-20 PROCEDURE — 72110 X-RAY EXAM L-2 SPINE 4/>VWS: CPT

## 2018-05-27 ENCOUNTER — OFFICE VISIT (OUTPATIENT)
Dept: URGENT CARE | Facility: CLINIC | Age: 68
End: 2018-05-27
Payer: MEDICARE

## 2018-05-27 ENCOUNTER — APPOINTMENT (OUTPATIENT)
Dept: RADIOLOGY | Facility: IMAGING CENTER | Age: 68
End: 2018-05-27
Attending: NURSE PRACTITIONER
Payer: MEDICARE

## 2018-05-27 VITALS
OXYGEN SATURATION: 90 % | HEIGHT: 67 IN | HEART RATE: 105 BPM | TEMPERATURE: 98.7 F | DIASTOLIC BLOOD PRESSURE: 70 MMHG | BODY MASS INDEX: 28.56 KG/M2 | RESPIRATION RATE: 18 BRPM | SYSTOLIC BLOOD PRESSURE: 110 MMHG | WEIGHT: 182 LBS

## 2018-05-27 DIAGNOSIS — R06.02 SOB (SHORTNESS OF BREATH): ICD-10-CM

## 2018-05-27 DIAGNOSIS — J44.1 ACUTE EXACERBATION OF CHRONIC OBSTRUCTIVE PULMONARY DISEASE (COPD) (HCC): ICD-10-CM

## 2018-05-27 DIAGNOSIS — R05.9 COUGH: ICD-10-CM

## 2018-05-27 PROCEDURE — 99214 OFFICE O/P EST MOD 30 MIN: CPT | Mod: 25 | Performed by: NURSE PRACTITIONER

## 2018-05-27 PROCEDURE — 94640 AIRWAY INHALATION TREATMENT: CPT | Performed by: NURSE PRACTITIONER

## 2018-05-27 PROCEDURE — 71046 X-RAY EXAM CHEST 2 VIEWS: CPT | Mod: TC,FY | Performed by: NURSE PRACTITIONER

## 2018-05-27 RX ORDER — DOXYCYCLINE HYCLATE 100 MG
100 TABLET ORAL 2 TIMES DAILY
Qty: 20 TAB | Refills: 0 | Status: SHIPPED | OUTPATIENT
Start: 2018-05-27 | End: 2018-05-29

## 2018-05-27 RX ORDER — METHYLPREDNISOLONE SODIUM SUCCINATE 125 MG/2ML
125 INJECTION, POWDER, LYOPHILIZED, FOR SOLUTION INTRAMUSCULAR; INTRAVENOUS ONCE
Status: COMPLETED | OUTPATIENT
Start: 2018-05-27 | End: 2018-05-27

## 2018-05-27 RX ORDER — PREDNISONE 20 MG/1
TABLET ORAL
Qty: 30 TAB | Refills: 0 | Status: SHIPPED | OUTPATIENT
Start: 2018-05-27 | End: 2018-05-29

## 2018-05-27 RX ORDER — IPRATROPIUM BROMIDE AND ALBUTEROL SULFATE 2.5; .5 MG/3ML; MG/3ML
3 SOLUTION RESPIRATORY (INHALATION) ONCE
Status: COMPLETED | OUTPATIENT
Start: 2018-05-27 | End: 2018-05-27

## 2018-05-27 RX ADMIN — METHYLPREDNISOLONE SODIUM SUCCINATE 125 MG: 125 INJECTION, POWDER, LYOPHILIZED, FOR SOLUTION INTRAMUSCULAR; INTRAVENOUS at 12:40

## 2018-05-27 RX ADMIN — IPRATROPIUM BROMIDE AND ALBUTEROL SULFATE 3 ML: 2.5; .5 SOLUTION RESPIRATORY (INHALATION) at 12:07

## 2018-05-27 ASSESSMENT — ENCOUNTER SYMPTOMS
SPUTUM PRODUCTION: 1
WEAKNESS: 1
SHORTNESS OF BREATH: 1
COUGH: 1

## 2018-05-27 NOTE — PROGRESS NOTES
"Subjective:      Edwin Gregorio Jr. is a 67 y.o. male who presents with Cough and Shortness of Breath    Past Medical History:   Diagnosis Date   • Chronic airway obstruction, not elsewhere classified    • Depression      Social History     Social History   • Marital status:      Spouse name: N/A   • Number of children: N/A   • Years of education: N/A     Occupational History   • Not on file.     Social History Main Topics   • Smoking status: Current Every Day Smoker     Packs/day: 1.00     Types: Cigarettes   • Smokeless tobacco: Never Used   • Alcohol use No   • Drug use: No   • Sexual activity: No     Other Topics Concern   • Not on file     Social History Narrative   • No narrative on file     History reviewed. No pertinent family history.    Allergies: Augmentin    Patient is a 67-year-old male who presents today with complaint of cough and shortness of breath. Positive history of COPD. Denies fever, aches, or chills. Cough has been productive, started over the last 7 days.          Cough   This is a new problem. The current episode started in the past 7 days. The problem has been gradually worsening. The problem occurs every few minutes. Associated symptoms include shortness of breath. Nothing aggravates the symptoms. He has tried nothing for the symptoms. The treatment provided no relief.       Review of Systems   Constitutional: Positive for malaise/fatigue.   HENT: Positive for congestion.    Respiratory: Positive for cough, sputum production and shortness of breath.    Skin: Negative.    Neurological: Positive for weakness.   All other systems reviewed and are negative.         Objective:     /70   Pulse (!) 105   Temp 37.1 °C (98.7 °F)   Resp 18   Ht 1.689 m (5' 6.5\")   Wt 82.6 kg (182 lb)   SpO2 90%   BMI 28.94 kg/m²      Physical Exam   Constitutional: He is oriented to person, place, and time. He appears well-developed and well-nourished.   HENT:   Head: Normocephalic.   Right " Ear: External ear normal.   Left Ear: External ear normal.   Nose: Nose normal.   Mouth/Throat: Oropharynx is clear and moist.   Eyes: Conjunctivae and EOM are normal. Pupils are equal, round, and reactive to light.   Neck: Normal range of motion. Neck supple.   Pulmonary/Chest: He has wheezes.   Diminished breath sounds   Musculoskeletal: Normal range of motion.   Neurological: He is alert and oriented to person, place, and time.   Skin: Skin is warm and dry. Capillary refill takes less than 2 seconds.   Psychiatric: He has a normal mood and affect. His behavior is normal. Judgment and thought content normal.   Vitals reviewed.    O2 sats rechecked, 94%-95% RA    XR chest :    5/27/2018 11:43 AM    HISTORY/REASON FOR EXAM:  Shortness of Breath  Cough, fever    TECHNIQUE/EXAM DESCRIPTION AND NUMBER OF VIEWS:  Two views of the chest.    COMPARISON:  4/13/2018    FINDINGS:  Cardiac mediastinal contour is unchanged.  Lungs show hypoinflation.  Patchy increased opacity at both lung bases.  No pleural fluid collection or pneumothorax.  Degenerative change of thoracic spine with accentuated kyphosis.   Impression       Hypoinflation with bibasilar atelectasis.         Post treatment: Patient reports mild relief.             Assessment/Plan:     1. Cough/bronchitis  -Prednisone  -combivent inhaler  -Strict ER precatuions for worsening of symptoms.  -doxycycline  -Solumedrol IM  -Strict ER precautions for worsening of symptoms.     2. SOB (shortness of breath)/history of COPD  -Prednisone  -combivent inhaler  -Strict ER precatuions for worsening of symptoms.

## 2018-05-29 ENCOUNTER — APPOINTMENT (OUTPATIENT)
Dept: RADIOLOGY | Facility: MEDICAL CENTER | Age: 68
DRG: 071 | End: 2018-05-29
Attending: EMERGENCY MEDICINE
Payer: MEDICARE

## 2018-05-29 ENCOUNTER — HOSPITAL ENCOUNTER (INPATIENT)
Facility: MEDICAL CENTER | Age: 68
LOS: 1 days | DRG: 071 | End: 2018-05-30
Attending: EMERGENCY MEDICINE | Admitting: HOSPITALIST
Payer: MEDICARE

## 2018-05-29 DIAGNOSIS — G93.40 ACUTE ENCEPHALOPATHY: ICD-10-CM

## 2018-05-29 DIAGNOSIS — D72.828 OTHER ELEVATED WHITE BLOOD CELL (WBC) COUNT: ICD-10-CM

## 2018-05-29 PROBLEM — M54.9 CHRONIC BACK PAIN: Status: ACTIVE | Noted: 2018-05-29

## 2018-05-29 PROBLEM — F17.200 TOBACCO DEPENDENCE: Status: ACTIVE | Noted: 2018-05-29

## 2018-05-29 PROBLEM — D72.829 LEUKOCYTOSIS: Status: ACTIVE | Noted: 2018-05-29

## 2018-05-29 PROBLEM — G89.29 CHRONIC BACK PAIN: Status: ACTIVE | Noted: 2018-05-29

## 2018-05-29 PROBLEM — E87.1 HYPONATREMIA: Status: ACTIVE | Noted: 2018-05-29

## 2018-05-29 PROBLEM — F32.A DEPRESSION: Status: ACTIVE | Noted: 2018-05-29

## 2018-05-29 LAB
ALBUMIN SERPL BCP-MCNC: 3.9 G/DL (ref 3.2–4.9)
ALBUMIN/GLOB SERPL: 1.2 G/DL
ALP SERPL-CCNC: 80 U/L (ref 30–99)
ALT SERPL-CCNC: 31 U/L (ref 2–50)
AMPHETAMINES UR QL: NEGATIVE
ANION GAP SERPL CALC-SCNC: 6 MMOL/L (ref 0–11.9)
APPEARANCE UR: CLEAR
APTT PPP: 27.5 SEC (ref 24.7–36)
AST SERPL-CCNC: 40 U/L (ref 12–45)
BARBITURATES UR QL SCN: NEGATIVE
BASOPHILS # BLD AUTO: 0.2 % (ref 0–1.8)
BASOPHILS # BLD: 0.04 K/UL (ref 0–0.12)
BENZODIAZ UR QL SCN: NEGATIVE
BILIRUB SERPL-MCNC: 0.5 MG/DL (ref 0.1–1.5)
BILIRUB UR QL STRIP.AUTO: NEGATIVE
BUN SERPL-MCNC: 22 MG/DL (ref 8–22)
BURR CELLS/RBC NFR CSF MANUAL: 0 %
BZE UR QL SCN: NEGATIVE
CALCIUM SERPL-MCNC: 8.8 MG/DL (ref 8.4–10.2)
CHLORIDE SERPL-SCNC: 107 MMOL/L (ref 96–112)
CLARITY CSF: CLEAR
CO2 SERPL-SCNC: 21 MMOL/L (ref 20–33)
COLOR CSF: COLORLESS
COLOR SPUN CSF: COLORLESS
COLOR UR: YELLOW
CREAT SERPL-MCNC: 1.16 MG/DL (ref 0.5–1.4)
EKG IMPRESSION: NORMAL
EOSINOPHIL # BLD AUTO: 0.01 K/UL (ref 0–0.51)
EOSINOPHIL NFR BLD: 0 % (ref 0–6.9)
ERYTHROCYTE [DISTWIDTH] IN BLOOD BY AUTOMATED COUNT: 48.2 FL (ref 35.9–50)
ETHANOL BLD-MCNC: 0 G/DL
GLOBULIN SER CALC-MCNC: 3.2 G/DL (ref 1.9–3.5)
GLUCOSE CSF-MCNC: 85 MG/DL (ref 40–80)
GLUCOSE SERPL-MCNC: 135 MG/DL (ref 65–99)
GLUCOSE UR STRIP.AUTO-MCNC: NEGATIVE MG/DL
GRAM STN SPEC: NORMAL
HCT VFR BLD AUTO: 38.2 % (ref 42–52)
HGB BLD-MCNC: 12.9 G/DL (ref 14–18)
IMM GRANULOCYTES # BLD AUTO: 0.17 K/UL (ref 0–0.11)
IMM GRANULOCYTES NFR BLD AUTO: 0.7 % (ref 0–0.9)
INR PPP: 1.02 (ref 0.87–1.13)
KETONES UR STRIP.AUTO-MCNC: NEGATIVE MG/DL
LACTATE BLD-SCNC: 1.82 MMOL/L (ref 0.5–2)
LACTATE BLD-SCNC: 1.94 MMOL/L (ref 0.5–2)
LEUKOCYTE ESTERASE UR QL STRIP.AUTO: NEGATIVE
LIPASE SERPL-CCNC: 46 U/L (ref 7–58)
LYMPHOCYTES # BLD AUTO: 1.08 K/UL (ref 1–4.8)
LYMPHOCYTES NFR BLD: 4.6 % (ref 22–41)
LYMPHOCYTES NFR CSF: 16 %
MAGNESIUM SERPL-MCNC: 2.1 MG/DL (ref 1.5–2.5)
MCH RBC QN AUTO: 32.5 PG (ref 27–33)
MCHC RBC AUTO-ENTMCNC: 33.8 G/DL (ref 33.7–35.3)
MCV RBC AUTO: 96.2 FL (ref 81.4–97.8)
MICRO URNS: NORMAL
MONOCYTES # BLD AUTO: 0.96 K/UL (ref 0–0.85)
MONOCYTES NFR BLD AUTO: 4.1 % (ref 0–13.4)
MONONUC CELLS NFR CSF: 54 %
NEUTROPHILS # BLD AUTO: 21.3 K/UL (ref 1.82–7.42)
NEUTROPHILS NFR BLD: 90.4 % (ref 44–72)
NITRITE UR QL STRIP.AUTO: NEGATIVE
NRBC # BLD AUTO: 0 K/UL
NRBC BLD-RTO: 0 /100 WBC
PCP UR QL SCN: NEGATIVE
PH UR STRIP.AUTO: 6.5 [PH]
PLATELET # BLD AUTO: 295 K/UL (ref 164–446)
PMV BLD AUTO: 10.5 FL (ref 9–12.9)
POTASSIUM SERPL-SCNC: 3.5 MMOL/L (ref 3.6–5.5)
PROCALCITONIN SERPL-MCNC: <0.05 NG/ML
PROT CSF-MCNC: 52 MG/DL (ref 15–45)
PROT SERPL-MCNC: 7.1 G/DL (ref 6–8.2)
PROT UR QL STRIP: NEGATIVE MG/DL
PROTHROMBIN TIME: 13.3 SEC (ref 12–14.6)
RBC # BLD AUTO: 3.97 M/UL (ref 4.7–6.1)
RBC # CSF: 1 CELLS/UL
RBC UR QL AUTO: NEGATIVE
SIGNIFICANT IND 70042: NORMAL
SITE SITE: NORMAL
SODIUM SERPL-SCNC: 134 MMOL/L (ref 135–145)
SOURCE SOURCE: NORMAL
SP GR UR STRIP.AUTO: <=1.005
SPECIMEN VOL CSF: 5.1 ML
T4 FREE SERPL-MCNC: 0.69 NG/DL (ref 0.58–1.64)
TROPONIN I SERPL-MCNC: <0.02 NG/ML (ref 0–0.04)
TSH SERPL DL<=0.005 MIU/L-ACNC: 0.43 UIU/ML (ref 0.38–5.33)
TUBE # CSF: 3
TUBE # CSF: 4
UR OPIATES 2659: POSITIVE
UR THC 2511T: NEGATIVE
UR TRICYCLIC 2660: NEGATIVE
WBC # BLD AUTO: 23.6 K/UL (ref 4.8–10.8)
WBC # CSF: 1 CELLS/UL (ref 0–10)

## 2018-05-29 PROCEDURE — 87070 CULTURE OTHR SPECIMN AEROBIC: CPT

## 2018-05-29 PROCEDURE — A9270 NON-COVERED ITEM OR SERVICE: HCPCS | Performed by: HOSPITALIST

## 2018-05-29 PROCEDURE — 84443 ASSAY THYROID STIM HORMONE: CPT

## 2018-05-29 PROCEDURE — 82945 GLUCOSE OTHER FLUID: CPT

## 2018-05-29 PROCEDURE — 87483 CNS DNA AMP PROBE TYPE 12-25: CPT

## 2018-05-29 PROCEDURE — 80305 DRUG TEST PRSMV DIR OPT OBS: CPT

## 2018-05-29 PROCEDURE — 96374 THER/PROPH/DIAG INJ IV PUSH: CPT

## 2018-05-29 PROCEDURE — 94760 N-INVAS EAR/PLS OXIMETRY 1: CPT

## 2018-05-29 PROCEDURE — 89051 BODY FLUID CELL COUNT: CPT

## 2018-05-29 PROCEDURE — 009U3ZX DRAINAGE OF SPINAL CANAL, PERCUTANEOUS APPROACH, DIAGNOSTIC: ICD-10-PCS | Performed by: EMERGENCY MEDICINE

## 2018-05-29 PROCEDURE — 85025 COMPLETE CBC W/AUTO DIFF WBC: CPT

## 2018-05-29 PROCEDURE — 770020 HCHG ROOM/CARE - TELE (206)

## 2018-05-29 PROCEDURE — 700101 HCHG RX REV CODE 250: Performed by: HOSPITALIST

## 2018-05-29 PROCEDURE — 71045 X-RAY EXAM CHEST 1 VIEW: CPT

## 2018-05-29 PROCEDURE — 83735 ASSAY OF MAGNESIUM: CPT

## 2018-05-29 PROCEDURE — 85610 PROTHROMBIN TIME: CPT

## 2018-05-29 PROCEDURE — 700111 HCHG RX REV CODE 636 W/ 250 OVERRIDE (IP): Performed by: EMERGENCY MEDICINE

## 2018-05-29 PROCEDURE — 70450 CT HEAD/BRAIN W/O DYE: CPT

## 2018-05-29 PROCEDURE — 99285 EMERGENCY DEPT VISIT HI MDM: CPT

## 2018-05-29 PROCEDURE — 83605 ASSAY OF LACTIC ACID: CPT

## 2018-05-29 PROCEDURE — 87205 SMEAR GRAM STAIN: CPT

## 2018-05-29 PROCEDURE — 84157 ASSAY OF PROTEIN OTHER: CPT

## 2018-05-29 PROCEDURE — 93005 ELECTROCARDIOGRAM TRACING: CPT | Performed by: EMERGENCY MEDICINE

## 2018-05-29 PROCEDURE — 84145 PROCALCITONIN (PCT): CPT

## 2018-05-29 PROCEDURE — 36415 COLL VENOUS BLD VENIPUNCTURE: CPT

## 2018-05-29 PROCEDURE — 81003 URINALYSIS AUTO W/O SCOPE: CPT

## 2018-05-29 PROCEDURE — 93005 ELECTROCARDIOGRAM TRACING: CPT

## 2018-05-29 PROCEDURE — 85730 THROMBOPLASTIN TIME PARTIAL: CPT

## 2018-05-29 PROCEDURE — 87040 BLOOD CULTURE FOR BACTERIA: CPT

## 2018-05-29 PROCEDURE — 83690 ASSAY OF LIPASE: CPT

## 2018-05-29 PROCEDURE — 99221 1ST HOSP IP/OBS SF/LOW 40: CPT | Mod: AI | Performed by: HOSPITALIST

## 2018-05-29 PROCEDURE — 84484 ASSAY OF TROPONIN QUANT: CPT

## 2018-05-29 PROCEDURE — 80307 DRUG TEST PRSMV CHEM ANLYZR: CPT

## 2018-05-29 PROCEDURE — 700105 HCHG RX REV CODE 258: Performed by: EMERGENCY MEDICINE

## 2018-05-29 PROCEDURE — 62270 DX LMBR SPI PNXR: CPT

## 2018-05-29 PROCEDURE — 700105 HCHG RX REV CODE 258: Performed by: HOSPITALIST

## 2018-05-29 PROCEDURE — 80053 COMPREHEN METABOLIC PANEL: CPT

## 2018-05-29 PROCEDURE — 700102 HCHG RX REV CODE 250 W/ 637 OVERRIDE(OP): Performed by: HOSPITALIST

## 2018-05-29 PROCEDURE — 86780 TREPONEMA PALLIDUM: CPT

## 2018-05-29 PROCEDURE — 84439 ASSAY OF FREE THYROXINE: CPT

## 2018-05-29 RX ORDER — AMOXICILLIN 250 MG
2 CAPSULE ORAL 2 TIMES DAILY
Status: DISCONTINUED | OUTPATIENT
Start: 2018-05-29 | End: 2018-05-30 | Stop reason: HOSPADM

## 2018-05-29 RX ORDER — ALBUTEROL SULFATE 90 UG/1
2 AEROSOL, METERED RESPIRATORY (INHALATION) 4 TIMES DAILY
Status: DISCONTINUED | OUTPATIENT
Start: 2018-05-29 | End: 2018-05-30 | Stop reason: HOSPADM

## 2018-05-29 RX ORDER — DOXYCYCLINE HYCLATE 100 MG
100 TABLET ORAL 2 TIMES DAILY
COMMUNITY
Start: 2018-05-26 | End: 2021-04-07

## 2018-05-29 RX ORDER — BISACODYL 10 MG
10 SUPPOSITORY, RECTAL RECTAL
Status: DISCONTINUED | OUTPATIENT
Start: 2018-05-29 | End: 2018-05-30 | Stop reason: HOSPADM

## 2018-05-29 RX ORDER — OMEPRAZOLE 20 MG/1
20 CAPSULE, DELAYED RELEASE ORAL DAILY
Status: DISCONTINUED | OUTPATIENT
Start: 2018-05-30 | End: 2018-05-30 | Stop reason: HOSPADM

## 2018-05-29 RX ORDER — ALBUTEROL SULFATE 90 UG/1
2 AEROSOL, METERED RESPIRATORY (INHALATION) 4 TIMES DAILY
COMMUNITY

## 2018-05-29 RX ORDER — SODIUM CHLORIDE 9 MG/ML
500 INJECTION, SOLUTION INTRAVENOUS
Status: DISCONTINUED | OUTPATIENT
Start: 2018-05-29 | End: 2018-05-30 | Stop reason: HOSPADM

## 2018-05-29 RX ORDER — SODIUM CHLORIDE 9 MG/ML
30 INJECTION, SOLUTION INTRAVENOUS
Status: DISCONTINUED | OUTPATIENT
Start: 2018-05-29 | End: 2018-05-30 | Stop reason: HOSPADM

## 2018-05-29 RX ORDER — ATORVASTATIN CALCIUM 40 MG/1
20 TABLET, FILM COATED ORAL NIGHTLY
Status: DISCONTINUED | OUTPATIENT
Start: 2018-05-29 | End: 2018-05-30 | Stop reason: HOSPADM

## 2018-05-29 RX ORDER — TOPIRAMATE 100 MG/1
100 TABLET, FILM COATED ORAL 2 TIMES DAILY
Status: DISCONTINUED | OUTPATIENT
Start: 2018-05-29 | End: 2018-05-30 | Stop reason: HOSPADM

## 2018-05-29 RX ORDER — SODIUM CHLORIDE 9 MG/ML
INJECTION, SOLUTION INTRAVENOUS CONTINUOUS
Status: DISCONTINUED | OUTPATIENT
Start: 2018-05-29 | End: 2018-05-30 | Stop reason: HOSPADM

## 2018-05-29 RX ORDER — POLYETHYLENE GLYCOL 3350 17 G/17G
1 POWDER, FOR SOLUTION ORAL
Status: DISCONTINUED | OUTPATIENT
Start: 2018-05-29 | End: 2018-05-30 | Stop reason: HOSPADM

## 2018-05-29 RX ORDER — CEFTRIAXONE 2 G/1
2 INJECTION, POWDER, FOR SOLUTION INTRAMUSCULAR; INTRAVENOUS ONCE
Status: COMPLETED | OUTPATIENT
Start: 2018-05-29 | End: 2018-05-29

## 2018-05-29 RX ORDER — LANSOPRAZOLE 30 MG/1
30 CAPSULE, DELAYED RELEASE ORAL EVERY EVENING
Status: DISCONTINUED | OUTPATIENT
Start: 2018-05-29 | End: 2018-05-29

## 2018-05-29 RX ORDER — NICOTINE 21 MG/24HR
21 PATCH, TRANSDERMAL 24 HOURS TRANSDERMAL
Status: DISCONTINUED | OUTPATIENT
Start: 2018-05-30 | End: 2018-05-29

## 2018-05-29 RX ORDER — NICOTINE 21 MG/24HR
21 PATCH, TRANSDERMAL 24 HOURS TRANSDERMAL
Status: DISCONTINUED | OUTPATIENT
Start: 2018-05-29 | End: 2018-05-30 | Stop reason: HOSPADM

## 2018-05-29 RX ORDER — SODIUM CHLORIDE 9 MG/ML
1000 INJECTION, SOLUTION INTRAVENOUS ONCE
Status: COMPLETED | OUTPATIENT
Start: 2018-05-29 | End: 2018-05-29

## 2018-05-29 RX ORDER — PREDNISONE 20 MG/1
20 TABLET ORAL SEE ADMIN INSTRUCTIONS
COMMUNITY
Start: 2018-05-27 | End: 2019-03-08

## 2018-05-29 RX ADMIN — CEFTRIAXONE SODIUM 2 G: 2 INJECTION, POWDER, FOR SOLUTION INTRAMUSCULAR; INTRAVENOUS at 16:43

## 2018-05-29 RX ADMIN — TOPIRAMATE 100 MG: 100 TABLET, FILM COATED ORAL at 20:17

## 2018-05-29 RX ADMIN — ATORVASTATIN CALCIUM 20 MG: 40 TABLET, FILM COATED ORAL at 20:16

## 2018-05-29 RX ADMIN — ALBUTEROL SULFATE 2 PUFF: 90 AEROSOL, METERED RESPIRATORY (INHALATION) at 20:19

## 2018-05-29 RX ADMIN — SODIUM CHLORIDE: 9 INJECTION, SOLUTION INTRAVENOUS at 20:16

## 2018-05-29 RX ADMIN — SODIUM CHLORIDE 1000 ML: 9 INJECTION, SOLUTION INTRAVENOUS at 15:45

## 2018-05-29 RX ADMIN — NICOTINE 21 MG: 21 PATCH, EXTENDED RELEASE TRANSDERMAL at 21:24

## 2018-05-29 ASSESSMENT — COPD QUESTIONNAIRES
DURING THE PAST 4 WEEKS HOW MUCH DID YOU FEEL SHORT OF BREATH: SOME OF THE TIME
DO YOU EVER COUGH UP ANY MUCUS OR PHLEGM?: NO/ONLY WITH OCCASIONAL COLDS OR INFECTIONS
COPD SCREENING SCORE: 5
HAVE YOU SMOKED AT LEAST 100 CIGARETTES IN YOUR ENTIRE LIFE: YES

## 2018-05-29 ASSESSMENT — LIFESTYLE VARIABLES
EVER_SMOKED: YES
EVER_SMOKED: YES

## 2018-05-29 ASSESSMENT — PAIN SCALES - GENERAL
PAINLEVEL_OUTOF10: 0
PAINLEVEL_OUTOF10: 0

## 2018-05-29 NOTE — ED PROVIDER NOTES
ED Provider Note    CHIEF COMPLAINT  Chief Complaint   Patient presents with   • ALOC       HPI  Edwin Gregorio Jr. is a 67 y.o. male who presents to the ER.  Altered mentation.  The patient is confused and unable to provide any history.  Therefore, he does not.  History is obtained from his wife.  The patient's wife says that he's been confused for about a week.  It is increasing generalized confusion, weakness and shakiness.  This is been gradually worsening and today she thought she should bring him in for an evaluation.  About a month ago.  He didn't fall, hit his head.  No other falls since that time.  He seems to be much more confused and unable to complete tasks of daily living without any help .  For example unable to produce shoes on without assistance.  He is shaky when he tries to move or walk.  He is generally weak.  He has not had a fever, he has had a cough and was recently put on prednisone by the urgent care.  No urinary complaints.  No new or different medications.    REVIEW OF SYSTEMS  See HPI for further details. All other systems are negative.  This is obtained from speaking with the wife.    PAST MEDICAL HISTORY  Past Medical History:   Diagnosis Date   • Chronic airway obstruction, not elsewhere classified    • Depression        FAMILY HISTORY  No family history on file.    SOCIAL HISTORY  Social History     Social History   • Marital status:      Spouse name: N/A   • Number of children: N/A   • Years of education: N/A     Social History Main Topics   • Smoking status: Current Every Day Smoker     Packs/day: 1.00     Types: Cigarettes   • Smokeless tobacco: Never Used   • Alcohol use No   • Drug use: No   • Sexual activity: No     Other Topics Concern   • Not on file     Social History Narrative   • No narrative on file       SURGICAL HISTORY  History reviewed. No pertinent surgical history.    CURRENT MEDICATIONS  Home Medications    **Home medications have not yet been reviewed for  "this encounter**         ALLERGIES  Allergies   Allergen Reactions   • Augmentin        PHYSICAL EXAM  VITAL SIGNS: /72   Pulse 79   Temp 36.8 °C (98.2 °F)   Resp 18   Ht 1.702 m (5' 7\")   Wt 82.6 kg (182 lb)   SpO2 96%   BMI 28.51 kg/m²    Constitutional: Awake, alert, ill-appearing, no acute cardiopulmonary distress.  Very slow to respond.  HENT: Normocephalic, Atraumatic, Bilateral external ears normal, Oropharynx very dry, No oral exudates, Nose normal.   Eyes: PERRL, EOMI, Conjunctiva normal, No discharge.   Neck: Normal range of motion, No tenderness, Supple, No stridor.   Cardiovascular: Normal heart rate, Normal rhythm, No murmurs, No rubs, No gallops.   Thorax & Lungs: Normal breath sounds, No respiratory distress, No wheezing  Abdomen: Bowel sounds normal, Soft, No tenderness,  Skin: Warm, Dry, No erythema, No rash.   Back: No tenderness, No CVA tenderness.   Musculoskeletal: Good range of motion in all major joints.  Neurologic: Alert, not oriented.  Generally weak, but confused.  Does move all extremities.  Generalized weakness noted in all shot is more pronounced in the uppers and lowers.  Tremor and difficulty with movement.  Psychiatric: Affect is flat     EKG  EKG Interpretation    Interpreted by me    Rhythm: normal sinus   Rate: normal  Axis: normal  Ectopy: none  Conduction: Right bundle-branch block  ST Segments: Right bundle branch block with secondary ST-T changes  T Waves: no acute change  Q Waves: none    Clinical Impression: no acute changes and normal EKG    RADIOLOGY/PROCEDURES  DX-CHEST-PORTABLE (1 VIEW)   Final Result      No acute cardiopulmonary abnormality identified.      CT-HEAD W/O   Final Result      No acute intracranial abnormality      MR-BRAIN-WITH & W/O    (Results Pending)         Lumbar Puncture Procedure Note    Indication: Suspected meningitis    Consent: The patient was and spouse was counseled regarding the procedure, it's indications, risks, potential " complications and alternatives and any questions were answered. Consent was obtained.    Procedure: The patient was placed in the right lateral decubitus position and the appropriate landmarks were identified. The area was prepped and draped in the usual sterile fashion. Anesthesia was obtained using 3 cc of 1% Lidocaine without epinephrine. A spinal needle was inserted at the L4- L5 level with the stylet in place until spinal fluid was returned. Opening pressure was not measured. At this point 5.0 cc of clear cerebral spinal fluid was obtained and sent for appropriate testing. The stylet was then replaced and the needle was withdrawn. A sterile dressing was placed over the site and the patient was placed in the supine position.    The patient tolerated the procedure well.    Complications: None        COURSE & MEDICAL DECISION MAKING  Pertinent Labs & Imaging studies reviewed. (See chart for details)  The patient presents with acute altered mentation.  A broad differential diagnosis was considered including but not limited to, sepsis, UTI, dehydration, brain mass, l electrolyte abnormality, polypharmacy.    Patient is worked up.  Metabolic workup, chest x-ray, head CT are negative except for he has a leukocytosis.  The patient doesn't have a headache or stiff neck, but he still is quite confused and has a marked encephalopathy.  In the setting of an elevated white blood cell count, I think we have to exclude meningitis certainly do not have a source or leukocytosis.  He has not had a fever.    I spoke with the wife and the patient about the risks and benefits including spinal headache,, bleeding, pain were all discussed.  INR is normal.    Patient had lumbar puncture performed, sterile technique with An gown on.  No complications.  Clear fluid was sent to the lab.  He is empirically given 2 g of Rocephin.    The patient is at some improvement of his mental status while here, but is still not normal.  Tremor has  significant better.  He is conversing still slow still confused.  He'll be admitted to the hospital for continued workup and treatment.        FINAL IMPRESSION  1. Acute encephalopathy    2. Other elevated white blood cell (WBC) count    3.  Lumbar puncture performed by me.      2.   3.         Electronically signed by: Hernan García, 5/29/2018 3:27 PM

## 2018-05-29 NOTE — ED NOTES
"Patient presents with wife for worsening confusion for 1 week. Patient was seen at urgent care on Saturday and was told \"he had something in his lungs but the nurse practitioner was unable to explain what it was.\" Patient is AO to self and wife only.   "

## 2018-05-29 NOTE — ED NOTES
Med rec updated and complete  Allergies reviewed  Interviewed pt with wife at bedside with permission from pt.  Pts wife had a list of medications, went over list of medications and returned list of medications back to   Pts wife reports that they have not picked up his COMBIVENT 20-100mcg  Pts wife reports that he is taking his Albuterol 4 times a day, not as needed.  I informed the nurse.

## 2018-05-30 VITALS
HEART RATE: 76 BPM | BODY MASS INDEX: 27.36 KG/M2 | SYSTOLIC BLOOD PRESSURE: 142 MMHG | WEIGHT: 184.75 LBS | DIASTOLIC BLOOD PRESSURE: 95 MMHG | RESPIRATION RATE: 20 BRPM | OXYGEN SATURATION: 91 % | HEIGHT: 69 IN | TEMPERATURE: 97.7 F

## 2018-05-30 LAB
ALBUMIN SERPL BCP-MCNC: 3.5 G/DL (ref 3.2–4.9)
ALBUMIN/GLOB SERPL: 1.1 G/DL
ALP SERPL-CCNC: 73 U/L (ref 30–99)
ALT SERPL-CCNC: 29 U/L (ref 2–50)
ANION GAP SERPL CALC-SCNC: 7 MMOL/L (ref 0–11.9)
AST SERPL-CCNC: 31 U/L (ref 12–45)
BASOPHILS # BLD AUTO: 0.1 % (ref 0–1.8)
BASOPHILS # BLD: 0.03 K/UL (ref 0–0.12)
BILIRUB SERPL-MCNC: 0.4 MG/DL (ref 0.1–1.5)
BUN SERPL-MCNC: 20 MG/DL (ref 8–22)
CALCIUM SERPL-MCNC: 8.5 MG/DL (ref 8.4–10.2)
CHLORIDE SERPL-SCNC: 116 MMOL/L (ref 96–112)
CO2 SERPL-SCNC: 18 MMOL/L (ref 20–33)
CREAT SERPL-MCNC: 1.1 MG/DL (ref 0.5–1.4)
EOSINOPHIL # BLD AUTO: 0 K/UL (ref 0–0.51)
EOSINOPHIL NFR BLD: 0 % (ref 0–6.9)
ERYTHROCYTE [DISTWIDTH] IN BLOOD BY AUTOMATED COUNT: 47.9 FL (ref 35.9–50)
GLOBULIN SER CALC-MCNC: 3.1 G/DL (ref 1.9–3.5)
GLUCOSE SERPL-MCNC: 99 MG/DL (ref 65–99)
HCT VFR BLD AUTO: 36.9 % (ref 42–52)
HGB BLD-MCNC: 12.4 G/DL (ref 14–18)
IMM GRANULOCYTES # BLD AUTO: 0.15 K/UL (ref 0–0.11)
IMM GRANULOCYTES NFR BLD AUTO: 0.7 % (ref 0–0.9)
LACTATE BLD-SCNC: 1.54 MMOL/L (ref 0.5–2)
LYMPHOCYTES # BLD AUTO: 3.09 K/UL (ref 1–4.8)
LYMPHOCYTES NFR BLD: 14.1 % (ref 22–41)
MCH RBC QN AUTO: 32.4 PG (ref 27–33)
MCHC RBC AUTO-ENTMCNC: 33.6 G/DL (ref 33.7–35.3)
MCV RBC AUTO: 96.3 FL (ref 81.4–97.8)
MONOCYTES # BLD AUTO: 1.71 K/UL (ref 0–0.85)
MONOCYTES NFR BLD AUTO: 7.8 % (ref 0–13.4)
NEUTROPHILS # BLD AUTO: 16.88 K/UL (ref 1.82–7.42)
NEUTROPHILS NFR BLD: 77.3 % (ref 44–72)
NRBC # BLD AUTO: 0 K/UL
NRBC BLD-RTO: 0 /100 WBC
PLATELET # BLD AUTO: 270 K/UL (ref 164–446)
PMV BLD AUTO: 10.7 FL (ref 9–12.9)
POTASSIUM SERPL-SCNC: 3.3 MMOL/L (ref 3.6–5.5)
PROT SERPL-MCNC: 6.6 G/DL (ref 6–8.2)
RBC # BLD AUTO: 3.83 M/UL (ref 4.7–6.1)
SODIUM SERPL-SCNC: 141 MMOL/L (ref 135–145)
TREPONEMA PALLIDUM IGG+IGM AB [PRESENCE] IN SERUM OR PLASMA BY IMMUNOASSAY: NON REACTIVE
WBC # BLD AUTO: 21.9 K/UL (ref 4.8–10.8)

## 2018-05-30 PROCEDURE — 80053 COMPREHEN METABOLIC PANEL: CPT

## 2018-05-30 PROCEDURE — 83605 ASSAY OF LACTIC ACID: CPT

## 2018-05-30 PROCEDURE — 700105 HCHG RX REV CODE 258: Performed by: HOSPITALIST

## 2018-05-30 PROCEDURE — 99238 HOSP IP/OBS DSCHRG MGMT 30/<: CPT | Performed by: HOSPITALIST

## 2018-05-30 PROCEDURE — 85025 COMPLETE CBC W/AUTO DIFF WBC: CPT

## 2018-05-30 RX ADMIN — SODIUM CHLORIDE: 9 INJECTION, SOLUTION INTRAVENOUS at 05:46

## 2018-05-30 NOTE — DISCHARGE SUMMARY
Discharge Summary    CHIEF COMPLAINT ON ADMISSION  Chief Complaint   Patient presents with   • ALOC       Reason for Admission  confusion short of breath     Admission Date  5/29/2018    CODE STATUS  Full Code    HPI & HOSPITAL COURSE  This is a 67 y.o. male here with altered mental status that is recurrent. He had workup started including spinal tap which did not show obvious infection or pathology. Unfortunately the patient left against medical advice and would not wait for me to come to the bedside to speak with him. The patient's nurse informed me that he left.        Therefore, he is discharged in guarded and stable condition against medcial advice.      Discharge Date  5/30/2018    FOLLOW UP ITEMS POST DISCHARGE  Hopefully he will follow up with a provider of his choice for further evaluation.    DISCHARGE DIAGNOSES  Active Problems:    Acute encephalopathy POA: Unknown    Leukocytosis POA: Unknown    Chronic back pain POA: Unknown    Tobacco dependence POA: Unknown    Depression POA: Unknown    Hyponatremia POA: Unknown  Resolved Problems:    * No resolved hospital problems. *      FOLLOW UP  No future appointments.  No follow-up provider specified.    MEDICATIONS ON DISCHARGE     Medication List      ASK your doctor about these medications      Instructions   albuterol 108 (90 Base) MCG/ACT Aers inhalation aerosol   Inhale 2 Puffs by mouth 4 times a day.  Dose:  2 Puff     atorvastatin 20 MG Tabs  Commonly known as:  LIPITOR   Take 20 mg by mouth every evening.  Dose:  20 mg     doxycycline 100 MG Tabs  Commonly known as:  VIBRAMYCIN   Take 100 mg by mouth 2 times a day. Pt started on 5/26/2018 for 10 day course.  Dose:  100 mg     fish oil 1000 MG Caps capsule   Take 1,000 mg by mouth every evening.  Dose:  1000 mg     lansoprazole 30 MG Cpdr  Commonly known as:  PREVACID   Take 30 mg by mouth every evening.  Dose:  30 mg     morphine ER 15 MG Tbcr tablet  Commonly known as:  MS CONTIN   Take 15 mg by mouth  "every 12 hours.  Dose:  15 mg     MULTI VITAMIN DAILY PO   Take 1 Tab by mouth every day.  Dose:  1 Tab     OLANZapine 5 MG Tabs  Commonly known as:  ZYPREXA   Take 5 mg by mouth every evening.  Dose:  5 mg     PARoxetine 30 MG Tabs  Commonly known as:  PAXIL   Take 60 mg by mouth every day.  Dose:  60 mg     predniSONE 20 MG Tabs  Commonly known as:  DELTASONE   Take 20 mg by mouth See Admin Instructions. Pt started on 5/27/2018 for 14 day course. Take 1 tablet 3 times daily for 5 days, then take 1 tablet 2 times daily for 3 day, then take 1 tablet daily for 3 day, then take 1/2 tablet daily for 3 days.  Dose:  20 mg     SUMAtriptan 50 MG Tabs  Commonly known as:  IMITREX   Take 100 mg by mouth Once PRN.  Dose:  100 mg     topiramate 100 MG Tabs  Commonly known as:  TOPAMAX   Take 100 mg by mouth 2 times a day.  Dose:  100 mg     traZODone 100 MG Tabs  Commonly known as:  DESYREL   Take 150 mg by mouth every evening.  Dose:  150 mg     TUMS PO   Take 1-2 Tabs by mouth as needed (For upset stomach).  Dose:  1-2 Tab            Allergies  Allergies   Allergen Reactions   • Augmentin Hives     \"Hives all over body\".         DIET  Orders Placed This Encounter   Procedures   • Diet Order     Standing Status:   Standing     Number of Occurrences:   1     Order Specific Question:   Diet:     Answer:   Regular [1]       ACTIVITY  As tolerated.  Weight bearing as tolerated    CONSULTATIONS  N/A    PROCEDURES  N/A    LABORATORY  Lab Results   Component Value Date    SODIUM 141 05/30/2018    POTASSIUM 3.3 (L) 05/30/2018    CHLORIDE 116 (H) 05/30/2018    CO2 18 (L) 05/30/2018    GLUCOSE 99 05/30/2018    BUN 20 05/30/2018    CREATININE 1.10 05/30/2018        Lab Results   Component Value Date    WBC 21.9 (H) 05/30/2018    HEMOGLOBIN 12.4 (L) 05/30/2018    HEMATOCRIT 36.9 (L) 05/30/2018    PLATELETCT 270 05/30/2018        Total time of the discharge process exceeds 20 minutes.  "

## 2018-05-30 NOTE — PROGRESS NOTES
Report given to Julee day-shift RN. Pt POC discussed, pt resting comfortably in bed, all safety precautions in place.

## 2018-05-30 NOTE — ED NOTES
Report called to HUMBERTO Brumfield.  Aware pt is on the way to floor.  Transferring via gurney w/ tele.  No change in condition on departing ED.

## 2018-05-30 NOTE — PROGRESS NOTES
Received report from night shift RN (Octaviano). Discussed plan of care, assumed care of patient. Safety measures in place.

## 2018-05-30 NOTE — PROGRESS NOTES
Patient unable to stay in room or follow commands. Wife, Patti, in with no success attempting to assist staff in controlling patient behavior. Patient goes AMA.

## 2018-05-30 NOTE — ASSESSMENT & PLAN NOTE
Possibly 2/2 to polypharmacy vs CNS infection altough low suspicion (No signs of feves, photophobia, no neck stiffness) ?   UA & CXR: neg for acute findings.  WBC on admission was >23,000   Spinal Tap done, fluid clear, WNL, glucose and protein very minimal evaluated, unimpressive.  HSV/Gram stain, cultures, RPR pending  TSH/Free T4 pending.  Patient has had ALOC almost on every admission 2/2 to [facility] etc.. Also consider early dementia?  MRI brain ordered to r/o subacute strokes, r/o infectious causes of encephalopathy

## 2018-05-30 NOTE — PROGRESS NOTES
"@ 0630 - Pt pulled out IV, dressing in clothes, pt confused, wanting to leave, tele monitor off, Pt oriented to situation - pt instructed to sit down and wait for wife to arrive, pt compliant - sitting on edge of bed     @ 0650 - Pt increasingly agitated, ambulating hallway w/ CNA, pt stating he wants to go home, asking for his wife, pt wife called and alerted of situation; pt's wife stated she is \"on her way now\" - pt made aware of wifes pending arrival         "

## 2018-05-30 NOTE — PROGRESS NOTES
Tele Summary:    Rhythm: SR/SB  Rate: 50s-80s  MT: 0.16  QRS: 0.14  QT: 0.42    Ectopy: rare PVCs/PACs, R-BBB    Tele strip placed in pt chart.

## 2018-05-30 NOTE — ASSESSMENT & PLAN NOTE
Nicotine patch was provided, patient is altered to provide tobacco cessation counseling but will need so once he clears up mentally.

## 2018-05-30 NOTE — H&P
Hospital Medicine History and Physical    Date of Service  5/29/2018    Chief Complaint  Chief Complaint   Patient presents with   • ALOC       History of Presenting Illness  67 y.o. male with a past medical history ofChronic back pain, depression, dyslipidemia, tobacco dependence presented 5/29/2018 to the ER for altered mentation. Patient is confused at this time unable to provide an accurate history. History was obtained by patient's wife says that he has been confused about a week, she says he is normally clear mentally. From heart review on his prior admissions appears to patient has had altered level of consciousness on several admissions after he infection such as pneumonia. At this point patient is awake and alert to location and is able to say what year it is, did not know who the current president was. Patient is clinically stable no signs of sepsis noted his heart rate as well as blood pressure is within normal limits. At this point we will admit the patient for further diagnostic workup including, further fluid analysis from his lumbar puncture as well as an MRI of his brain to rule out micro-ischemia, or signs of infectious encephalopathy.            Primary Care Physician  Omar Griggs M.D.    Consultants  None    Code Status  Code: Full code    Review of Systems  Review of Systems   Unable to perform ROS: Mental status change          Past Medical History  Past Medical History:   Diagnosis Date   • Chronic airway obstruction, not elsewhere classified    • Depression        Surgical History  No past surgical history on file.    Medications  No current facility-administered medications on file prior to encounter.      Current Outpatient Prescriptions on File Prior to Encounter   Medication Sig Dispense Refill   • morphine ER (MS CONTIN) 15 MG Tab CR tablet Take 15 mg by mouth every 12 hours.     • Omega-3 Fatty Acids (FISH OIL) 1000 MG Cap capsule Take 1,000 mg by mouth every evening.     • Multiple  "Vitamin (MULTI VITAMIN DAILY PO) Take 1 Tab by mouth every day.     • lansoprazole (PREVACID) 30 MG CAPSULE DELAYED RELEASE Take 30 mg by mouth every evening.     • topiramate (TOPAMAX) 100 MG TABS Take 100 mg by mouth 2 times a day.     • paroxetine (PAXIL) 30 MG TABS Take 60 mg by mouth every day.     • sumatriptan (IMITREX) 50 MG TABS Take 100 mg by mouth Once PRN.     • trazodone (DESYREL) 100 MG TABS Take 150 mg by mouth every evening.     • atorvastatin (LIPITOR) 20 MG TABS Take 20 mg by mouth every evening.     • olanzapine (ZYPREXA) 5 MG TABS Take 5 mg by mouth every evening.         Family History  No family history on file.    Social History  Social History   Substance Use Topics   • Smoking status: Current Every Day Smoker     Packs/day: 1.00     Types: Cigarettes   • Smokeless tobacco: Never Used   • Alcohol use No       Allergies  Allergies   Allergen Reactions   • Augmentin Hives     \"Hives all over body\".          Physical Exam  Laboratory   Hemodynamics  Temp (24hrs), Av.8 °C (98.2 °F), Min:36.8 °C (98.2 °F), Max:36.8 °C (98.2 °F)   Temperature: 36.8 °C (98.2 °F)  Pulse  Av.6  Min: 67  Max: 79 Heart Rate (Monitored): 68  Blood Pressure : 139/72, NIBP: 137/83      Respiratory      Respiration: 17, Pulse Oximetry: 93 %             Physical Exam   Constitutional: He appears well-developed and well-nourished. No distress.   HENT:   Head: Normocephalic and atraumatic.   Mouth/Throat: No oropharyngeal exudate.   Eyes: Conjunctivae are normal. Pupils are equal, round, and reactive to light. Right eye exhibits no discharge. No scleral icterus.   Neck: Neck supple. No JVD present. No thyromegaly present.   Cardiovascular: Normal rate and intact distal pulses.    No murmur heard.  Pulses:       Dorsalis pedis pulses are 2+ on the right side, and 2+ on the left side.   Cap refill < 3 s   Pulmonary/Chest: Effort normal and breath sounds normal. No stridor. No respiratory distress. He has no wheezes. He " has no rales.   Abdominal: Soft. Bowel sounds are normal. He exhibits no distension. There is no tenderness. There is no rebound.   Musculoskeletal: Normal range of motion. He exhibits no edema.   Neurological: He is alert. No cranial nerve deficit.   Skin: Skin is warm and dry. He is not diaphoretic. No erythema.   Psychiatric: His behavior is normal.   Confused but pleasant         Assessment/Plan  Hyponatremia   Assessment & Plan    Mild hyponatremia.  Continue IVF, recheck bmp in the am        Depression   Assessment & Plan    On home dose of paxil, trazadone,   Will resume paxil but hold trazodone for now         Tobacco dependence   Assessment & Plan    Nicotine patch was provided, patient is altered to provide tobacco cessation counseling but will need so once he clears up mentally.        Chronic back pain   Assessment & Plan    Held home dose of Morphine ER for now in setting of altered mental status, may resume short acting the patient exhibits pain, watch for signs of withdrawals for now        Leukocytosis   Assessment & Plan    Unknown etiology at this point.  CXR/UA neg.  LP spinal analysis as above, fluids counts WNL. Unimpressive.  Empiric abx IV Ceftriaxone started.  Urine, blood cultures pending         Acute encephalopathy   Assessment & Plan    Possibly 2/2 to polypharmacy vs CNS infection altough low suspicion (No signs of feves, photophobia, no neck stiffness) ?   UA & CXR: neg for acute findings.  WBC on admission was >23,000   Spinal Tap done, fluid clear, WNL, glucose and protein very minimal evaluated, unimpressive.  HSV/Gram stain, cultures, RPR pending  TSH/Free T4 pending.  Patient has had ALOC almost on every admission 2/2 to PNA etc.. Also consider early dementia?  MRI brain ordered to r/o subacute strokes, r/o infectious causes of encephalopathy              I anticipate this patient will require at least two midnights for appropriate medical management, necessitating inpatient  admission.    Prophylaxis: lovenox    Recent Labs      05/29/18   1530   WBC  23.6*   RBC  3.97*   HEMOGLOBIN  12.9*   HEMATOCRIT  38.2*   MCV  96.2   MCH  32.5   MCHC  33.8   RDW  48.2   PLATELETCT  295   MPV  10.5     Recent Labs      05/29/18   1530   SODIUM  134*   POTASSIUM  3.5*   CHLORIDE  107   CO2  21   GLUCOSE  135*   BUN  22   CREATININE  1.16   CALCIUM  8.8     Recent Labs      05/29/18   1530   ALTSGPT  31   ASTSGOT  40   ALKPHOSPHAT  80   TBILIRUBIN  0.5   LIPASE  46   GLUCOSE  135*     Recent Labs      05/29/18   1530   APTT  27.5   INR  1.02             Lab Results   Component Value Date    TROPONINI <0.02 05/29/2018       Imaging  DX-CHEST-PORTABLE (1 VIEW)   Final Result      No acute cardiopulmonary abnormality identified.      CT-HEAD W/O   Final Result      No acute intracranial abnormality      MR-BRAIN-WITH & W/O    (Results Pending)

## 2018-05-30 NOTE — RESPIRATORY CARE
COPD EDUCATION by COPD CLINICAL EDUCATOR  5/30/2018 at 10:51 AM by Tavia Celeste     Patient reviewed by COPD education team. Patient does not qualify for COPD program.

## 2018-05-30 NOTE — PROGRESS NOTES
@ 1950 - Pt ambulated to room w/ SBA, oriented to room and call light, placed on tele monitor, vitals taken, admit profile completed w/ pt and spouse at the bedside, meds given per MAR, NS infusing - IV patent, bed alarm on, call light in reach and spouse at the bedside    Pt requested nicotine patch for now - pharmacy called for reschedule     POC, tests, and labs discussed at the bedside, all pt and spouse questions answered, MRI screen sheet completed and faxed to MRI    @ 2125 - nicotine patch administered to pt     @ 2220 - Pt spouse alerted RN of pt confusion, repeating of words, RN assessed pt - oriented to person, place, and time though slow to answer when asked questions, VSS, pt resting in bed - will continue to monitor     @ 2310 - Pt dislodged IV during sleep, removed by RN, pending new IV placement    @ 0020 - new IV placed, NS infusing @ 100ml/hr

## 2018-05-30 NOTE — ASSESSMENT & PLAN NOTE
Held home dose of Morphine ER for now in setting of altered mental status, may resume short acting the patient exhibits pain, watch for signs of withdrawals for now

## 2018-05-30 NOTE — ASSESSMENT & PLAN NOTE
Unknown etiology at this point.  CXR/UA neg.  LP spinal analysis as above, fluids counts WNL. Unimpressive.  Empiric abx IV Ceftriaxone started.  Urine, blood cultures pending

## 2018-05-31 LAB
C GATTII+NEOFOR DNA CSF QL NAA+NON-PROBE: NOT DETECTED
CMV DNA CSF QL NAA+NON-PROBE: NOT DETECTED
E COLI K1 DNA CSF QL NAA+NON-PROBE: NOT DETECTED
EV RNA CSF QL NAA+NON-PROBE: NOT DETECTED
GP B STREP DNA CSF QL NAA+NON-PROBE: NOT DETECTED
HAEM INFLU DNA CSF QL NAA+NON-PROBE: NOT DETECTED
HHV6 DNA CSF QL NAA+NON-PROBE: NOT DETECTED
HSV1 DNA CSF QL NAA+NON-PROBE: NOT DETECTED
HSV2 DNA CSF QL NAA+NON-PROBE: NOT DETECTED
L MONOCYTOG DNA CSF QL NAA+NON-PROBE: NOT DETECTED
N MEN DNA CSF QL NAA+NON-PROBE: NOT DETECTED
PARECHOVIRUS A RNA CSF QL NAA+NON-PROBE: NOT DETECTED
S PNEUM DNA CSF QL NAA+NON-PROBE: NOT DETECTED
VZV DNA CSF QL NAA+NON-PROBE: NOT DETECTED

## 2018-06-01 LAB
BACTERIA CSF CULT: NORMAL
GRAM STN SPEC: NORMAL
SIGNIFICANT IND 70042: NORMAL
SITE SITE: NORMAL
SOURCE SOURCE: NORMAL

## 2018-06-15 ENCOUNTER — HOSPITAL ENCOUNTER (OUTPATIENT)
Dept: LAB | Facility: MEDICAL CENTER | Age: 68
End: 2018-06-15
Attending: FAMILY MEDICINE
Payer: MEDICARE

## 2018-06-15 LAB
ALBUMIN SERPL BCP-MCNC: 4.1 G/DL (ref 3.2–4.9)
ALBUMIN/GLOB SERPL: 1.1 G/DL
ALP SERPL-CCNC: 127 U/L (ref 30–99)
ALT SERPL-CCNC: 39 U/L (ref 2–50)
ANION GAP SERPL CALC-SCNC: 6 MMOL/L (ref 0–11.9)
APPEARANCE UR: CLEAR
AST SERPL-CCNC: 38 U/L (ref 12–45)
BASOPHILS # BLD AUTO: 1.1 % (ref 0–1.8)
BASOPHILS # BLD: 0.09 K/UL (ref 0–0.12)
BILIRUB SERPL-MCNC: 0.4 MG/DL (ref 0.1–1.5)
BILIRUB UR QL STRIP.AUTO: NEGATIVE
BUN SERPL-MCNC: 9 MG/DL (ref 8–22)
CALCIUM SERPL-MCNC: 9.4 MG/DL (ref 8.5–10.5)
CHLORIDE SERPL-SCNC: 110 MMOL/L (ref 96–112)
CO2 SERPL-SCNC: 24 MMOL/L (ref 20–33)
COLOR UR: YELLOW
CREAT SERPL-MCNC: 1.24 MG/DL (ref 0.5–1.4)
EOSINOPHIL # BLD AUTO: 0.39 K/UL (ref 0–0.51)
EOSINOPHIL NFR BLD: 4.6 % (ref 0–6.9)
ERYTHROCYTE [DISTWIDTH] IN BLOOD BY AUTOMATED COUNT: 47.8 FL (ref 35.9–50)
ESTRADIOL SERPL-MCNC: <20 PG/ML
GLOBULIN SER CALC-MCNC: 3.6 G/DL (ref 1.9–3.5)
GLUCOSE SERPL-MCNC: 114 MG/DL (ref 65–99)
GLUCOSE UR STRIP.AUTO-MCNC: NEGATIVE MG/DL
HCT VFR BLD AUTO: 42.2 % (ref 42–52)
HGB BLD-MCNC: 14 G/DL (ref 14–18)
IMM GRANULOCYTES # BLD AUTO: 0.03 K/UL (ref 0–0.11)
IMM GRANULOCYTES NFR BLD AUTO: 0.4 % (ref 0–0.9)
KETONES UR STRIP.AUTO-MCNC: NEGATIVE MG/DL
LEUKOCYTE ESTERASE UR QL STRIP.AUTO: NEGATIVE
LYMPHOCYTES # BLD AUTO: 1.86 K/UL (ref 1–4.8)
LYMPHOCYTES NFR BLD: 22.1 % (ref 22–41)
MCH RBC QN AUTO: 32 PG (ref 27–33)
MCHC RBC AUTO-ENTMCNC: 33.2 G/DL (ref 33.7–35.3)
MCV RBC AUTO: 96.6 FL (ref 81.4–97.8)
MICRO URNS: NORMAL
MONOCYTES # BLD AUTO: 0.48 K/UL (ref 0–0.85)
MONOCYTES NFR BLD AUTO: 5.7 % (ref 0–13.4)
NEUTROPHILS # BLD AUTO: 5.56 K/UL (ref 1.82–7.42)
NEUTROPHILS NFR BLD: 66.1 % (ref 44–72)
NITRITE UR QL STRIP.AUTO: NEGATIVE
NRBC # BLD AUTO: 0 K/UL
NRBC BLD-RTO: 0 /100 WBC
PH UR STRIP.AUTO: 7 [PH]
PLATELET # BLD AUTO: 372 K/UL (ref 164–446)
PMV BLD AUTO: 10.9 FL (ref 9–12.9)
POTASSIUM SERPL-SCNC: 3.7 MMOL/L (ref 3.6–5.5)
PROT SERPL-MCNC: 7.7 G/DL (ref 6–8.2)
PROT UR QL STRIP: NEGATIVE MG/DL
PSA SERPL-MCNC: 0.17 NG/ML (ref 0–4)
RBC # BLD AUTO: 4.37 M/UL (ref 4.7–6.1)
RBC UR QL AUTO: NEGATIVE
SODIUM SERPL-SCNC: 140 MMOL/L (ref 135–145)
SP GR UR STRIP.AUTO: 1.01
TESTOST SERPL-MCNC: 177 NG/DL (ref 175–781)
TSH SERPL DL<=0.005 MIU/L-ACNC: 1.28 UIU/ML (ref 0.38–5.33)
UROBILINOGEN UR STRIP.AUTO-MCNC: 0.2 MG/DL
WBC # BLD AUTO: 8.4 K/UL (ref 4.8–10.8)

## 2018-06-15 PROCEDURE — 36415 COLL VENOUS BLD VENIPUNCTURE: CPT

## 2018-06-15 PROCEDURE — 84443 ASSAY THYROID STIM HORMONE: CPT

## 2018-06-15 PROCEDURE — 84403 ASSAY OF TOTAL TESTOSTERONE: CPT

## 2018-06-15 PROCEDURE — 85025 COMPLETE CBC W/AUTO DIFF WBC: CPT

## 2018-06-15 PROCEDURE — 80053 COMPREHEN METABOLIC PANEL: CPT

## 2018-06-15 PROCEDURE — 84153 ASSAY OF PSA TOTAL: CPT | Mod: GA

## 2018-06-15 PROCEDURE — 81003 URINALYSIS AUTO W/O SCOPE: CPT

## 2018-06-15 PROCEDURE — 82670 ASSAY OF TOTAL ESTRADIOL: CPT

## 2019-03-01 ENCOUNTER — HOSPITAL ENCOUNTER (OUTPATIENT)
Dept: LAB | Facility: MEDICAL CENTER | Age: 69
End: 2019-03-01
Attending: FAMILY MEDICINE
Payer: MEDICARE

## 2019-03-01 LAB
ALBUMIN SERPL BCP-MCNC: 4.4 G/DL (ref 3.2–4.9)
ALBUMIN/GLOB SERPL: 1.4 G/DL
ALP SERPL-CCNC: 94 U/L (ref 30–99)
ALT SERPL-CCNC: 22 U/L (ref 2–50)
ANION GAP SERPL CALC-SCNC: 8 MMOL/L (ref 0–11.9)
APPEARANCE UR: CLEAR
AST SERPL-CCNC: 19 U/L (ref 12–45)
BILIRUB SERPL-MCNC: 0.5 MG/DL (ref 0.1–1.5)
BILIRUB UR QL STRIP.AUTO: NEGATIVE
BUN SERPL-MCNC: 11 MG/DL (ref 8–22)
CALCIUM SERPL-MCNC: 10.8 MG/DL (ref 8.5–10.5)
CHLORIDE SERPL-SCNC: 104 MMOL/L (ref 96–112)
CHOLEST SERPL-MCNC: 169 MG/DL (ref 100–199)
CO2 SERPL-SCNC: 29 MMOL/L (ref 20–33)
COLOR UR: YELLOW
CREAT SERPL-MCNC: 1.32 MG/DL (ref 0.5–1.4)
ERYTHROCYTE [DISTWIDTH] IN BLOOD BY AUTOMATED COUNT: 47.6 FL (ref 35.9–50)
EST. AVERAGE GLUCOSE BLD GHB EST-MCNC: 131 MG/DL
FASTING STATUS PATIENT QL REPORTED: NORMAL
GLOBULIN SER CALC-MCNC: 3.1 G/DL (ref 1.9–3.5)
GLUCOSE SERPL-MCNC: 102 MG/DL (ref 65–99)
GLUCOSE UR STRIP.AUTO-MCNC: NEGATIVE MG/DL
HBA1C MFR BLD: 6.2 % (ref 0–5.6)
HCT VFR BLD AUTO: 48.5 % (ref 42–52)
HDLC SERPL-MCNC: 40 MG/DL
HGB BLD-MCNC: 15.8 G/DL (ref 14–18)
KETONES UR STRIP.AUTO-MCNC: NEGATIVE MG/DL
LDLC SERPL CALC-MCNC: 93 MG/DL
LEUKOCYTE ESTERASE UR QL STRIP.AUTO: NEGATIVE
MCH RBC QN AUTO: 32.1 PG (ref 27–33)
MCHC RBC AUTO-ENTMCNC: 32.6 G/DL (ref 33.7–35.3)
MCV RBC AUTO: 98.6 FL (ref 81.4–97.8)
MICRO URNS: NORMAL
NITRITE UR QL STRIP.AUTO: NEGATIVE
PH UR STRIP.AUTO: 5.5 [PH]
PLATELET # BLD AUTO: 287 K/UL (ref 164–446)
PMV BLD AUTO: 10.9 FL (ref 9–12.9)
POTASSIUM SERPL-SCNC: 4.1 MMOL/L (ref 3.6–5.5)
PROT SERPL-MCNC: 7.5 G/DL (ref 6–8.2)
PROT UR QL STRIP: NEGATIVE MG/DL
PSA SERPL-MCNC: 0.11 NG/ML (ref 0–4)
RBC # BLD AUTO: 4.92 M/UL (ref 4.7–6.1)
RBC UR QL AUTO: NEGATIVE
SODIUM SERPL-SCNC: 141 MMOL/L (ref 135–145)
SP GR UR STRIP.AUTO: 1.01
TRIGL SERPL-MCNC: 180 MG/DL (ref 0–149)
UROBILINOGEN UR STRIP.AUTO-MCNC: 0.2 MG/DL
WBC # BLD AUTO: 10 K/UL (ref 4.8–10.8)

## 2019-03-01 PROCEDURE — 36415 COLL VENOUS BLD VENIPUNCTURE: CPT

## 2019-03-01 PROCEDURE — 83036 HEMOGLOBIN GLYCOSYLATED A1C: CPT | Mod: GA

## 2019-03-01 PROCEDURE — 81003 URINALYSIS AUTO W/O SCOPE: CPT

## 2019-03-01 PROCEDURE — 80053 COMPREHEN METABOLIC PANEL: CPT

## 2019-03-01 PROCEDURE — 85027 COMPLETE CBC AUTOMATED: CPT

## 2019-03-01 PROCEDURE — 84153 ASSAY OF PSA TOTAL: CPT | Mod: GA

## 2019-03-01 PROCEDURE — 80061 LIPID PANEL: CPT

## 2019-03-03 ENCOUNTER — HOSPITAL ENCOUNTER (OUTPATIENT)
Dept: RADIOLOGY | Facility: MEDICAL CENTER | Age: 69
End: 2019-03-03
Attending: FAMILY MEDICINE
Payer: MEDICARE

## 2019-03-03 DIAGNOSIS — R55 SYNCOPE AND COLLAPSE: ICD-10-CM

## 2019-03-03 PROCEDURE — 70450 CT HEAD/BRAIN W/O DYE: CPT

## 2019-03-08 ENCOUNTER — OFFICE VISIT (OUTPATIENT)
Dept: URGENT CARE | Facility: CLINIC | Age: 69
End: 2019-03-08
Payer: MEDICARE

## 2019-03-08 VITALS
SYSTOLIC BLOOD PRESSURE: 116 MMHG | WEIGHT: 178.8 LBS | HEART RATE: 92 BPM | TEMPERATURE: 97.7 F | RESPIRATION RATE: 14 BRPM | OXYGEN SATURATION: 94 % | DIASTOLIC BLOOD PRESSURE: 76 MMHG | BODY MASS INDEX: 26.4 KG/M2

## 2019-03-08 DIAGNOSIS — J44.0 ACUTE BRONCHITIS WITH CHRONIC OBSTRUCTIVE PULMONARY DISEASE (COPD) (HCC): ICD-10-CM

## 2019-03-08 DIAGNOSIS — J20.9 ACUTE BRONCHITIS WITH CHRONIC OBSTRUCTIVE PULMONARY DISEASE (COPD) (HCC): ICD-10-CM

## 2019-03-08 PROCEDURE — 99214 OFFICE O/P EST MOD 30 MIN: CPT | Performed by: PHYSICIAN ASSISTANT

## 2019-03-08 RX ORDER — PREDNISONE 20 MG/1
TABLET ORAL
Qty: 30 TAB | Refills: 0 | Status: SHIPPED | OUTPATIENT
Start: 2019-03-08 | End: 2019-03-08 | Stop reason: SDUPTHER

## 2019-03-08 RX ORDER — PREDNISONE 20 MG/1
TABLET ORAL
Qty: 8 TAB | Refills: 0 | Status: SHIPPED | OUTPATIENT
Start: 2019-03-08 | End: 2019-07-29

## 2019-03-08 RX ORDER — AZITHROMYCIN 250 MG/1
TABLET, FILM COATED ORAL
Qty: 6 TAB | Refills: 0 | Status: SHIPPED | OUTPATIENT
Start: 2019-03-08 | End: 2019-07-29

## 2019-03-08 RX ORDER — BENZONATATE 100 MG/1
100 CAPSULE ORAL 3 TIMES DAILY PRN
COMMUNITY
End: 2021-04-07

## 2019-03-08 RX ORDER — AZITHROMYCIN 250 MG/1
TABLET, FILM COATED ORAL
Qty: 6 TAB | Refills: 0 | Status: SHIPPED | OUTPATIENT
Start: 2019-03-08 | End: 2019-03-08 | Stop reason: SDUPTHER

## 2019-03-08 ASSESSMENT — ENCOUNTER SYMPTOMS
NAUSEA: 0
MYALGIAS: 0
ABDOMINAL PAIN: 0
BLURRED VISION: 0
PALPITATIONS: 0
SORE THROAT: 0
CONSTIPATION: 0
DIARRHEA: 0
COUGH: 1
FEVER: 0
SHORTNESS OF BREATH: 1
VOMITING: 0
DIZZINESS: 0
WHEEZING: 1
SPUTUM PRODUCTION: 1
HEADACHES: 0
EYE PAIN: 0
CHILLS: 0

## 2019-03-08 ASSESSMENT — COPD QUESTIONNAIRES: COPD: 1

## 2019-03-08 NOTE — PROGRESS NOTES
Subjective:      Edwin Gregorio Jr. is a 68 y.o. male who presents with Cough (dry cough x3day) and Nasal Congestion      Cough   This is a new problem. The current episode started in the past 7 days (Symptoms started 4 days ago). The problem has been gradually worsening. The problem occurs every few minutes. The cough is productive of sputum. Associated symptoms include nasal congestion, shortness of breath and wheezing. Pertinent negatives include no chest pain, chills, ear congestion, fever, headaches, myalgias, rash or sore throat. The symptoms are aggravated by lying down. He has tried prescription cough suppressant and a beta-agonist inhaler (Patient has been taking benzonatate prescribed by his PCP for a few days) for the symptoms. The treatment provided mild relief. His past medical history is significant for COPD.       Review of Systems   Constitutional: Negative for chills, fever and malaise/fatigue.   HENT: Positive for congestion. Negative for sore throat.    Eyes: Negative for blurred vision and pain.   Respiratory: Positive for cough, sputum production, shortness of breath and wheezing.    Cardiovascular: Negative for chest pain and palpitations.   Gastrointestinal: Negative for abdominal pain, constipation, diarrhea, nausea and vomiting.   Musculoskeletal: Negative for myalgias.   Skin: Negative for rash.   Neurological: Negative for dizziness and headaches.       PMH:  has a past medical history of Chronic airway obstruction, not elsewhere classified; Depression; and Hyperlipidemia. He also has no past medical history of Hypertension.  MEDS:   Current Outpatient Prescriptions:   •  benzonatate (TESSALON) 100 MG Cap, Take 100 mg by mouth 3 times a day as needed for Cough., Disp: , Rfl:   •  predniSONE (DELTASONE) 20 MG Tab, Take 2 tabs daily for 4 days, Disp: 8 Tab, Rfl: 0  •  azithromycin (ZITHROMAX) 250 MG Tab, Take two tabs po day one followed by one tab po day two through five with food, Disp:  "6 Tab, Rfl: 0  •  albuterol 108 (90 Base) MCG/ACT Aero Soln inhalation aerosol, Inhale 2 Puffs by mouth 4 times a day., Disp: , Rfl:   •  morphine ER (MS CONTIN) 15 MG Tab CR tablet, Take 15 mg by mouth every 12 hours., Disp: , Rfl:   •  lansoprazole (PREVACID) 30 MG CAPSULE DELAYED RELEASE, Take 30 mg by mouth every evening., Disp: , Rfl:   •  paroxetine (PAXIL) 30 MG TABS, Take 60 mg by mouth every day., Disp: , Rfl:   •  sumatriptan (IMITREX) 50 MG TABS, Take 100 mg by mouth Once PRN., Disp: , Rfl:   •  trazodone (DESYREL) 100 MG TABS, Take 150 mg by mouth every evening., Disp: , Rfl:   •  atorvastatin (LIPITOR) 20 MG TABS, Take 20 mg by mouth every evening., Disp: , Rfl:   •  olanzapine (ZYPREXA) 5 MG TABS, Take 5 mg by mouth every evening., Disp: , Rfl:   •  doxycycline (VIBRAMYCIN) 100 MG Tab, Take 100 mg by mouth 2 times a day. Pt started on 5/26/2018 for 10 day course., Disp: , Rfl:   •  Calcium Carbonate Antacid (TUMS PO), Take 1-2 Tabs by mouth as needed (For upset stomach)., Disp: , Rfl:   •  Omega-3 Fatty Acids (FISH OIL) 1000 MG Cap capsule, Take 1,000 mg by mouth every evening., Disp: , Rfl:   •  Multiple Vitamin (MULTI VITAMIN DAILY PO), Take 1 Tab by mouth every day., Disp: , Rfl:   •  topiramate (TOPAMAX) 100 MG TABS, Take 100 mg by mouth 2 times a day., Disp: , Rfl:   ALLERGIES:   Allergies   Allergen Reactions   • Augmentin Hives     \"Hives all over body\".       SURGHX: No past surgical history on file.  SOCHX:  reports that he has been smoking Cigarettes.  He has been smoking about 1.00 pack per day. He has never used smokeless tobacco. He reports that he does not drink alcohol or use drugs.  FH: Family history was reviewed, no pertinent findings to report     Objective:     /76 (BP Location: Left arm, Patient Position: Sitting, BP Cuff Size: Adult)   Pulse 92   Temp 36.5 °C (97.7 °F) (Temporal)   Resp 14   Wt 81.1 kg (178 lb 12.8 oz)   SpO2 94%   BMI 26.40 kg/m²      Physical Exam "   Constitutional: He is oriented to person, place, and time. He appears well-developed and well-nourished.   HENT:   Head: Normocephalic and atraumatic.   Right Ear: Tympanic membrane, external ear and ear canal normal.   Left Ear: Tympanic membrane, external ear and ear canal normal.   Nose: Mucosal edema present. Right sinus exhibits no maxillary sinus tenderness and no frontal sinus tenderness. Left sinus exhibits no maxillary sinus tenderness and no frontal sinus tenderness.   Mouth/Throat: Uvula is midline, oropharynx is clear and moist and mucous membranes are normal.   Eyes: Pupils are equal, round, and reactive to light. Conjunctivae are normal.   Neck: Normal range of motion.   Cardiovascular: Normal rate, regular rhythm and normal heart sounds.    No murmur heard.  Pulmonary/Chest: Effort normal. No accessory muscle usage. No respiratory distress. He has no decreased breath sounds. He has wheezes (Diffuse inspiratory and expiratory wheezes heard bilaterally in posterior lung fields). He has no rhonchi. He has no rales.   Lymphadenopathy:     He has no cervical adenopathy.   Neurological: He is alert and oriented to person, place, and time.   Skin: Skin is warm and dry. Capillary refill takes less than 2 seconds.   Psychiatric: He has a normal mood and affect. His behavior is normal. Judgment normal.   Vitals reviewed.       Assessment/Plan:     1. Acute bronchitis with chronic obstructive pulmonary disease (COPD) ([facility])  - predniSONE (DELTASONE) 20 MG Tab; Take 2 tabs daily for 4 days  Dispense: 8 Tab; Refill: 0  - azithromycin (ZITHROMAX) 250 MG Tab; Take two tabs po day one followed by one tab po day two through five with food  Dispense: 6 Tab; Refill: 0  Discussed with patient that his presentation is consistent with COPD exacerbation.  Will treat with a short course of steroids and antibiotic.  Advised that if his shortness of breath does not improve after a few days of being on the medications that he  should potentially return for reevaluation and possible chest x-ray at that time.      Differential Diagnosis, natural history, and supportive care discussed. Return to the Urgent Care or follow up with your PCP if symptoms fail to resolve, or for any new or worsening symptoms. Emergency room precautions discussed. Patient and/or family appears understanding of information.

## 2019-07-29 ENCOUNTER — HOSPITAL ENCOUNTER (OUTPATIENT)
Dept: LAB | Facility: MEDICAL CENTER | Age: 69
End: 2019-07-29
Attending: FAMILY MEDICINE
Payer: MEDICARE

## 2019-07-29 ENCOUNTER — OFFICE VISIT (OUTPATIENT)
Dept: URGENT CARE | Facility: CLINIC | Age: 69
End: 2019-07-29
Payer: MEDICARE

## 2019-07-29 VITALS
SYSTOLIC BLOOD PRESSURE: 112 MMHG | HEART RATE: 90 BPM | HEIGHT: 68 IN | WEIGHT: 178 LBS | OXYGEN SATURATION: 96 % | DIASTOLIC BLOOD PRESSURE: 78 MMHG | BODY MASS INDEX: 26.98 KG/M2 | TEMPERATURE: 97.9 F

## 2019-07-29 DIAGNOSIS — E86.0 DEHYDRATION: ICD-10-CM

## 2019-07-29 DIAGNOSIS — R42 DIZZINESS: Primary | ICD-10-CM

## 2019-07-29 DIAGNOSIS — J44.1 COPD EXACERBATION (HCC): ICD-10-CM

## 2019-07-29 DIAGNOSIS — Z86.39 HISTORY OF HYPERCALCEMIA: ICD-10-CM

## 2019-07-29 PROCEDURE — 80053 COMPREHEN METABOLIC PANEL: CPT

## 2019-07-29 PROCEDURE — 99214 OFFICE O/P EST MOD 30 MIN: CPT | Performed by: PHYSICIAN ASSISTANT

## 2019-07-29 PROCEDURE — 36415 COLL VENOUS BLD VENIPUNCTURE: CPT

## 2019-07-29 PROCEDURE — 80061 LIPID PANEL: CPT

## 2019-07-29 RX ORDER — PROCHLORPERAZINE MALEATE 5 MG/1
5 TABLET ORAL EVERY 6 HOURS PRN
Qty: 30 TAB | Refills: 3 | Status: SHIPPED | OUTPATIENT
Start: 2019-07-29 | End: 2021-04-07

## 2019-07-29 RX ORDER — PREDNISONE 20 MG/1
TABLET ORAL
Qty: 23 TAB | Refills: 0 | Status: SHIPPED | OUTPATIENT
Start: 2019-07-29 | End: 2021-04-07

## 2019-07-29 RX ORDER — ATORVASTATIN CALCIUM 40 MG/1
TABLET, FILM COATED ORAL
COMMUNITY
Start: 2019-07-04 | End: 2021-04-07

## 2019-07-29 NOTE — PATIENT INSTRUCTIONS
Chronic Obstructive Pulmonary Disease Exacerbation  Chronic obstructive pulmonary disease (COPD) is a common lung condition in which airflow from the lungs is limited. COPD is a general term that can be used to describe many different lung problems that limit airflow, including chronic bronchitis and emphysema. COPD exacerbations are episodes when breathing symptoms become much worse and require extra treatment. Without treatment, COPD exacerbations can be life threatening, and frequent COPD exacerbations can cause further damage to your lungs.  What are the causes?  · Respiratory infections.  · Exposure to smoke.  · Exposure to air pollution, chemical fumes, or dust.  Sometimes there is no apparent cause or trigger.  What increases the risk?  · Smoking cigarettes.  · Older age.  · Frequent prior COPD exacerbations.  What are the signs or symptoms?  · Increased coughing.  · Increased thick spit (sputum) production.  · Increased wheezing.  · Increased shortness of breath.  · Rapid breathing.  · Chest tightness.  How is this diagnosed?  Your medical history, a physical exam, and tests will help your health care provider make a diagnosis. Tests may include:  · A chest X-ray.  · Basic lab tests.  · Sputum testing.  · An arterial blood gas test.  How is this treated?  Depending on the severity of your COPD exacerbation, you may need to be admitted to a hospital for treatment. Some of the treatments commonly used to treat COPD exacerbations are:  · Antibiotic medicines.  · Bronchodilators. These are drugs that expand the air passages. They may be given with an inhaler or nebulizer. Spacer devices may be needed to help improve drug delivery.  · Corticosteroid medicines.  · Supplemental oxygen therapy.  · Airway clearing techniques, such as noninvasive ventilation (NIV) and positive expiratory pressure (PEP). These provide respiratory support through a mask or other noninvasive device.  Follow these instructions at  home:  · Do not smoke. Quitting smoking is very important to prevent COPD from getting worse and exacerbations from happening as often.  · Avoid exposure to all substances that irritate the airway, especially to tobacco smoke.  · If you were prescribed an antibiotic medicine, finish it all even if you start to feel better.  · Take all medicines as directed by your health care provider. It is important to use correct technique with inhaled medicines.  · Drink enough fluids to keep your urine clear or pale yellow (unless you have a medical condition that requires fluid restriction).  · Use a cool mist vaporizer. This makes it easier to clear your chest when you cough.  · If you have a home nebulizer and oxygen, continue to use them as directed.  · Maintain all necessary vaccinations to prevent infections.  · Exercise regularly.  · Eat a healthy diet.  · Keep all follow-up appointments as directed by your health care provider.  Get help right away if:  · You have worsening shortness of breath.  · You have trouble talking.  · You have severe chest pain.  · You have blood in your sputum.  · You have a fever.  · You have weakness, vomit repeatedly, or faint.  · You feel confused.  · You continue to get worse.  This information is not intended to replace advice given to you by your health care provider. Make sure you discuss any questions you have with your health care provider.  Document Released: 10/14/2008 Document Revised: 05/25/2017 Document Reviewed: 08/22/2014  [employer] Patient Education © 2017 [employer]

## 2019-07-29 NOTE — PROGRESS NOTES
Subjective:      Pt is a 68 y.o. male who presents with Dizziness (Dizziness in the morning. Pt states that this has been going on for months. )            HPI  This is a chronic issue. Pt notes morning dizziness 3 out of 7 days a week x 4 months now. Pt has had a CT head which was WNL and lab work which indicates chronic dehydration and hypercalcemia. Pt notes he has new labs to get drawn today for his PCP and he is here for his COPD exacerbation and something to control the dizziness. Pt has not taken any Rx medications for this condition. Pt states the pain is a 1-2/10 from coughing, aching in nature and worse at night. Pt denies CP, NVD, paresthesias, headaches,  change in vision, hives, or other joint pain. The pt's medication list, problem list, and allergies have been evaluated and reviewed during today's visit.    PMH:  Past Medical History:   Diagnosis Date   • Chronic airway obstruction, not elsewhere classified    • Depression    • Hyperlipidemia        PSH:  Negative per pt.      Fam Hx:  the patient's family history is not pertinent to their current complaint      Soc HX:  Social History     Social History   • Marital status:      Spouse name: N/A   • Number of children: N/A   • Years of education: N/A     Occupational History   • Not on file.     Social History Main Topics   • Smoking status: Current Every Day Smoker     Packs/day: 1.00     Types: Cigarettes   • Smokeless tobacco: Never Used   • Alcohol use No   • Drug use: No   • Sexual activity: No     Other Topics Concern   • Not on file     Social History Narrative   • No narrative on file         Medications:    Current Outpatient Prescriptions:   •  prochlorperazine (COMPAZINE) 5 MG Tab, Take 1 Tab by mouth every 6 hours as needed for Nausea/Vomiting., Disp: 30 Tab, Rfl: 3  •  predniSONE (DELTASONE) 20 MG Tab, Take 3 tabs at once PO daily x 5 days, then take 2 tabs at once daily x 3 days, then take 1 tab PO daily x 2 days, Disp: 23 Tab,  Rfl: 0  •  fluticasone-salmeterol (ADVAIR) 250-50 MCG/DOSE AEROSOL POWDER, BREATH ACTIVATED, Inhale 1 Puff by mouth every 12 hours., Disp: 1 Inhaler, Rfl: 0  •  morphine ER (MS CONTIN) 15 MG Tab CR tablet, Take 15 mg by mouth every 12 hours., Disp: , Rfl:   •  Omega-3 Fatty Acids (FISH OIL) 1000 MG Cap capsule, Take 1,000 mg by mouth every evening., Disp: , Rfl:   •  Multiple Vitamin (MULTI VITAMIN DAILY PO), Take 1 Tab by mouth every day., Disp: , Rfl:   •  lansoprazole (PREVACID) 30 MG CAPSULE DELAYED RELEASE, Take 30 mg by mouth every evening., Disp: , Rfl:   •  paroxetine (PAXIL) 30 MG TABS, Take 60 mg by mouth every day., Disp: , Rfl:   •  trazodone (DESYREL) 100 MG TABS, Take 150 mg by mouth every evening., Disp: , Rfl:   •  atorvastatin (LIPITOR) 20 MG TABS, Take 20 mg by mouth every evening., Disp: , Rfl:   •  olanzapine (ZYPREXA) 5 MG TABS, Take 5 mg by mouth every evening., Disp: , Rfl:   •  atorvastatin (LIPITOR) 40 MG Tab, , Disp: , Rfl:   •  benzonatate (TESSALON) 100 MG Cap, Take 100 mg by mouth 3 times a day as needed for Cough., Disp: , Rfl:   •  doxycycline (VIBRAMYCIN) 100 MG Tab, Take 100 mg by mouth 2 times a day. Pt started on 5/26/2018 for 10 day course., Disp: , Rfl:   •  Calcium Carbonate Antacid (TUMS PO), Take 1-2 Tabs by mouth as needed (For upset stomach)., Disp: , Rfl:   •  albuterol 108 (90 Base) MCG/ACT Aero Soln inhalation aerosol, Inhale 2 Puffs by mouth 4 times a day., Disp: , Rfl:   •  sumatriptan (IMITREX) 50 MG TABS, Take 100 mg by mouth Once PRN., Disp: , Rfl:       Allergies:  Augmentin    ROS  Constitutional: Negative for fever, chills and malaise/fatigue.   HENT: Negative for congestion and sore throat.    Eyes: Negative for blurred vision, double vision and photophobia.   Respiratory: +for cough and shortness of breath and worsening COPD.  Cardiovascular: Negative for chest pain and palpitations.   Gastrointestinal: Negative for heartburn, nausea, vomiting, abdominal pain,  "diarrhea and constipation.   Genitourinary: Negative for dysuria and flank pain.   Musculoskeletal: Negative for joint pain and myalgias.   Skin: Negative for itching and rash.   Neurological: +for dizziness  Endo/Heme/Allergies: Does not bruise/bleed easily.   Psychiatric/Behavioral: Negative for depression. The patient is not nervous/anxious.           Objective:     /78   Pulse 90   Temp 36.6 °C (97.9 °F)   Ht 1.727 m (5' 8\")   Wt 80.7 kg (178 lb)   SpO2 96%   BMI 27.06 kg/m²      Physical Exam      Constitutional: PT is oriented to person, place, and time. PT appears well-developed and well-nourished. No distress.   HENT:   Head: Normocephalic and atraumatic.   Mouth/Throat: Oropharynx is clear and moist. No oropharyngeal exudate.   Eyes: Conjunctivae normal and EOM are normal. Pupils are equal, round, and reactive to light.   Neck: Normal range of motion. Neck supple. No thyromegaly present.   Cardiovascular: Normal rate, regular rhythm, normal heart sounds and intact distal pulses.  Exam reveals no gallop and no friction rub.    No murmur heard.  Pulmonary/Chest: Effort normal and breath sounds normal. No respiratory distress. PT has no wheezes. PT has no rales. Pt exhibits no tenderness.   Abdominal: Soft. Bowel sounds are normal. PT exhibits no distension and no mass. There is no tenderness. There is no rebound and no guarding.   Musculoskeletal: Normal range of motion. PT exhibits no edema and no tenderness. Gait WNL and rapid hand exercises and heel to shin WNL  Neurological: PT is alert and oriented to person, place, and time. PT has normal reflexes. No cranial nerve deficit.   Skin: Skin is warm and dry. No rash noted. PT is not diaphoretic. No erythema.       Psychiatric: PT has a normal mood and affect. PT behavior is normal. Judgment and thought content normal.          Assessment/Plan:     1. Dizziness    - prochlorperazine (COMPAZINE) 5 MG Tab; Take 1 Tab by mouth every 6 hours as needed " for Nausea/Vomiting.  Dispense: 30 Tab; Refill: 3    2. Dehydration      3. COPD exacerbation (HCC)    - predniSONE (DELTASONE) 20 MG Tab; Take 3 tabs at once PO daily x 5 days, then take 2 tabs at once daily x 3 days, then take 1 tab PO daily x 2 days  Dispense: 23 Tab; Refill: 0  - fluticasone-salmeterol (ADVAIR) 250-50 MCG/DOSE AEROSOL POWDER, BREATH ACTIVATED; Inhale 1 Puff by mouth every 12 hours.  Dispense: 1 Inhaler; Refill: 0    4. History of hypercalcemia    Dizziness appears in link to chronic dehydration and noted skin tenting on exam. Upcoming lab work will reveal more.  STRICT ER precautions given  Recent CT head WNL  Lab work for PCP being drawn today  Rest, fluids encouraged.  AVS with medical info given.  Pt was in full understanding and agreement with the plan.  Differential diagnosis, natural history, supportive care, and indications for immediate follow-up discussed. All questions answered. Patient agrees with the plan of care.  Follow-up as needed if symptoms worsen or fail to improve.

## 2019-07-30 LAB
ALBUMIN SERPL BCP-MCNC: 4.6 G/DL (ref 3.2–4.9)
ALBUMIN/GLOB SERPL: 1.4 G/DL
ALP SERPL-CCNC: 112 U/L (ref 30–99)
ALT SERPL-CCNC: 45 U/L (ref 2–50)
ANION GAP SERPL CALC-SCNC: 12 MMOL/L (ref 0–11.9)
AST SERPL-CCNC: 37 U/L (ref 12–45)
BILIRUB SERPL-MCNC: 0.7 MG/DL (ref 0.1–1.5)
BUN SERPL-MCNC: 8 MG/DL (ref 8–22)
CALCIUM SERPL-MCNC: 9.6 MG/DL (ref 8.5–10.5)
CHLORIDE SERPL-SCNC: 102 MMOL/L (ref 96–112)
CHOLEST SERPL-MCNC: 178 MG/DL (ref 100–199)
CO2 SERPL-SCNC: 27 MMOL/L (ref 20–33)
CREAT SERPL-MCNC: 1.27 MG/DL (ref 0.5–1.4)
FASTING STATUS PATIENT QL REPORTED: NORMAL
GLOBULIN SER CALC-MCNC: 3.4 G/DL (ref 1.9–3.5)
GLUCOSE SERPL-MCNC: 94 MG/DL (ref 65–99)
HDLC SERPL-MCNC: 43 MG/DL
LDLC SERPL CALC-MCNC: 96 MG/DL
POTASSIUM SERPL-SCNC: 4.5 MMOL/L (ref 3.6–5.5)
PROT SERPL-MCNC: 8 G/DL (ref 6–8.2)
SODIUM SERPL-SCNC: 141 MMOL/L (ref 135–145)
TRIGL SERPL-MCNC: 195 MG/DL (ref 0–149)

## 2019-10-04 ENCOUNTER — HOSPITAL ENCOUNTER (OUTPATIENT)
Dept: LAB | Facility: MEDICAL CENTER | Age: 69
End: 2019-10-04
Attending: FAMILY MEDICINE
Payer: MEDICARE

## 2019-10-04 ENCOUNTER — HOSPITAL ENCOUNTER (OUTPATIENT)
Facility: MEDICAL CENTER | Age: 69
End: 2019-10-04
Attending: FAMILY MEDICINE
Payer: MEDICARE

## 2019-10-04 LAB
APPEARANCE UR: CLEAR
BILIRUB UR QL STRIP.AUTO: NEGATIVE
COLOR UR: YELLOW
FERRITIN SERPL-MCNC: 44.7 NG/ML (ref 22–322)
GLUCOSE UR STRIP.AUTO-MCNC: NEGATIVE MG/DL
IRON SATN MFR SERPL: 20 % (ref 15–55)
IRON SERPL-MCNC: 80 UG/DL (ref 50–180)
KETONES UR STRIP.AUTO-MCNC: NEGATIVE MG/DL
LEUKOCYTE ESTERASE UR QL STRIP.AUTO: NEGATIVE
MICRO URNS: NORMAL
NITRITE UR QL STRIP.AUTO: NEGATIVE
PH UR STRIP.AUTO: 5.5 [PH] (ref 5–8)
PROT UR QL STRIP: NEGATIVE MG/DL
PSA SERPL-MCNC: 0.13 NG/ML (ref 0–4)
RBC UR QL AUTO: NEGATIVE
SP GR UR STRIP.AUTO: 1.01
T4 SERPL-MCNC: 7.1 UG/DL (ref 4–12)
TIBC SERPL-MCNC: 403 UG/DL (ref 250–450)
TSH SERPL DL<=0.005 MIU/L-ACNC: 3.31 UIU/ML (ref 0.38–5.33)
UROBILINOGEN UR STRIP.AUTO-MCNC: 0.2 MG/DL

## 2019-10-04 PROCEDURE — 83550 IRON BINDING TEST: CPT | Mod: GA

## 2019-10-04 PROCEDURE — 84436 ASSAY OF TOTAL THYROXINE: CPT

## 2019-10-04 PROCEDURE — 83540 ASSAY OF IRON: CPT | Mod: GA

## 2019-10-04 PROCEDURE — 84153 ASSAY OF PSA TOTAL: CPT | Mod: GA

## 2019-10-04 PROCEDURE — 36415 COLL VENOUS BLD VENIPUNCTURE: CPT | Mod: GA

## 2019-10-04 PROCEDURE — 84443 ASSAY THYROID STIM HORMONE: CPT

## 2019-10-04 PROCEDURE — 81003 URINALYSIS AUTO W/O SCOPE: CPT

## 2019-10-04 PROCEDURE — 82728 ASSAY OF FERRITIN: CPT | Mod: GA

## 2019-11-12 NOTE — DISCHARGE INSTRUCTIONS
Discharge Instructions    Discharged to home by car with relative. Discharged via wheelchair, hospital escort: Yes.  Special equipment needed: Not Applicable    Be sure to schedule a follow-up appointment with your primary care doctor or any specialists as instructed.     Discharge Plan:   Diet Plan: Discussed  Activity Level: Discussed  Smoking Cessation Offered: Patient Counseled  Confirmed Follow up Appointment: Appointment Scheduled  Confirmed Symptoms Management: Discussed  Influenza Vaccine Indication: Patient Refuses    I understand that a diet low in cholesterol, fat, and sodium is recommended for good health. Unless I have been given specific instructions below for another diet, I accept this instruction as my diet prescription.   Other diet: Regular diet    Special Instructions: None    · Is patient discharged on Warfarin / Coumadin?   No     · Is patient Post Blood Transfusion?  No    Depression / Suicide Risk    As you are discharged from this [facility] facility, it is important to learn how to keep safe from harming yourself.    Recognize the warning signs:  · Abrupt changes in personality, positive or negative- including increase in energy   · Giving away possessions  · Change in eating patterns- significant weight changes-  positive or negative  · Change in sleeping patterns- unable to sleep or sleeping all the time   · Unwillingness or inability to communicate  · Depression  · Unusual sadness, discouragement and loneliness  · Talk of wanting to die  · Neglect of personal appearance   · Rebelliousness- reckless behavior  · Withdrawal from people/activities they love  · Confusion- inability to concentrate     If you or a loved one observes any of these behaviors or has concerns about self-harm, here's what you can do:  · Talk about it- your feelings and reasons for harming yourself  · Remove any means that you might use to hurt yourself (examples: pills, rope, extension cords, firearm)  · Get  professional help from the community (Mental Health, Substance Abuse, psychological counseling)  · Do not be alone:Call your Safe Contact- someone whom you trust who will be there for you.  · Call your local CRISIS HOTLINE 026-7197 or 385-442-4670  · Call your local Children's Mobile Crisis Response Team [place] (633) 128-4541 or www.[employer]  · Call the toll free National Suicide Prevention Hotlines   · National Suicide Prevention Lifeline 863-040-NPTS (8755)  · [employer] Line Network 800-SUICIDE (851-6054)    Shortness of Breath  Shortness of breath means you have trouble breathing. It could also mean that you have a medical problem. You should get immediate medical care for shortness of breath.  CAUSES   · Not enough oxygen in the air such as with high altitudes or a smoke-filled room.  · Certain lung diseases, infections, or problems.  · Heart disease or conditions, such as angina or heart failure.  · Low red blood cells (anemia).  · Poor physical fitness, which can cause shortness of breath when you exercise.  · Chest or back injuries or stiffness.  · Being overweight.  · Smoking.  · Anxiety, which can make you feel like you are not getting enough air.  DIAGNOSIS   Serious medical problems can often be found during your physical exam. Tests may also be done to determine why you are having shortness of breath. Tests may include:  2. Chest X-rays.  3. Lung function tests.  4. Blood tests.  5. An electrocardiogram (ECG).  6. An ambulatory electrocardiogram. An ambulatory ECG records your heartbeat patterns over a 24-hour period.  7. Exercise testing.  8. A transthoracic echocardiogram (TTE). During echocardiography, sound waves are used to evaluate how blood flows through your heart.  9. A transesophageal echocardiogram (SEE).  10. Imaging scans.  Your health care provider may not be able to find a cause for your shortness of breath after your exam. In this case, it is important to have a  follow-up exam with your health care provider as directed.   TREATMENT   Treatment for shortness of breath depends on the cause of your symptoms and can vary greatly.  HOME CARE INSTRUCTIONS   2. Do not smoke. Smoking is a common cause of shortness of breath. If you smoke, ask for help to quit.  3. Avoid being around chemicals or things that may bother your breathing, such as paint fumes and dust.  4. Rest as needed. Slowly resume your usual activities.  5. If medicines were prescribed, take them as directed for the full length of time directed. This includes oxygen and any inhaled medicines.  6. Keep all follow-up appointments as directed by your health care provider.  SEEK MEDICAL CARE IF:   2. Your condition does not improve in the time expected.  3. You have a hard time doing your normal activities even with rest.  4. You have any new symptoms.  SEEK IMMEDIATE MEDICAL CARE IF:   2. Your shortness of breath gets worse.  3. You feel light-headed, faint, or develop a cough not controlled with medicines.  4. You start coughing up blood.  5. You have pain with breathing.  6. You have chest pain or pain in your arms, shoulders, or abdomen.  7. You have a fever.  8. You are unable to walk up stairs or exercise the way you normally do.  MAKE SURE YOU:  · Understand these instructions.  · Will watch your condition.  · Will get help right away if you are not doing well or get worse.     This information is not intended to replace advice given to you by your health care provider. Make sure you discuss any questions you have with your health care provider.     Document Released: 09/12/2002 Document Revised: 12/23/2014 Document Reviewed: 03/04/2013  [employer] Patient Education ©2016 [employer]     patient

## 2021-02-02 ENCOUNTER — HOSPITAL ENCOUNTER (OUTPATIENT)
Dept: RADIOLOGY | Facility: MEDICAL CENTER | Age: 71
End: 2021-02-02
Attending: NURSE PRACTITIONER
Payer: MEDICARE

## 2021-02-02 DIAGNOSIS — M47.16 OSTEOARTHRITIS OF SPINE WITH MYELOPATHY, LUMBAR REGION: ICD-10-CM

## 2021-02-02 PROCEDURE — 72100 X-RAY EXAM L-S SPINE 2/3 VWS: CPT

## 2021-03-03 DIAGNOSIS — Z23 NEED FOR VACCINATION: ICD-10-CM

## 2021-03-12 ENCOUNTER — IMMUNIZATION (OUTPATIENT)
Dept: FAMILY PLANNING/WOMEN'S HEALTH CLINIC | Facility: IMMUNIZATION CENTER | Age: 71
End: 2021-03-12
Attending: INTERNAL MEDICINE
Payer: MEDICARE

## 2021-03-12 DIAGNOSIS — Z23 ENCOUNTER FOR VACCINATION: Primary | ICD-10-CM

## 2021-03-12 DIAGNOSIS — Z23 NEED FOR VACCINATION: ICD-10-CM

## 2021-03-12 PROCEDURE — 0011A MODERNA SARS-COV-2 VACCINE: CPT | Performed by: INTERNAL MEDICINE

## 2021-03-12 PROCEDURE — 91301 MODERNA SARS-COV-2 VACCINE: CPT | Performed by: INTERNAL MEDICINE

## 2021-04-07 ENCOUNTER — OFFICE VISIT (OUTPATIENT)
Dept: URGENT CARE | Facility: CLINIC | Age: 71
End: 2021-04-07
Payer: MEDICARE

## 2021-04-07 VITALS
SYSTOLIC BLOOD PRESSURE: 124 MMHG | TEMPERATURE: 99 F | DIASTOLIC BLOOD PRESSURE: 80 MMHG | HEART RATE: 87 BPM | OXYGEN SATURATION: 96 % | WEIGHT: 200.6 LBS | RESPIRATION RATE: 20 BRPM | HEIGHT: 68 IN | BODY MASS INDEX: 30.4 KG/M2

## 2021-04-07 DIAGNOSIS — J44.9 CHRONIC OBSTRUCTIVE PULMONARY DISEASE, UNSPECIFIED COPD TYPE (HCC): ICD-10-CM

## 2021-04-07 DIAGNOSIS — M25.511 ACUTE PAIN OF RIGHT SHOULDER: ICD-10-CM

## 2021-04-07 DIAGNOSIS — R07.9 CHEST PAIN, UNSPECIFIED TYPE: ICD-10-CM

## 2021-04-07 PROCEDURE — 99213 OFFICE O/P EST LOW 20 MIN: CPT | Performed by: STUDENT IN AN ORGANIZED HEALTH CARE EDUCATION/TRAINING PROGRAM

## 2021-04-07 RX ORDER — AMLODIPINE BESYLATE 5 MG/1
5 TABLET ORAL DAILY
COMMUNITY

## 2021-04-07 ASSESSMENT — PAIN SCALES - GENERAL: PAINLEVEL: 8=MODERATE-SEVERE PAIN

## 2021-04-08 NOTE — PROGRESS NOTES
"Subjective:   CHIEF COMPLAINT  Chief Complaint   Patient presents with   • Shoulder Pain     right shoulder blade, radiates right chest, worse with inspiration x3 days ago        HPI  Edwin Gregorio Jr. is a 70 y.o. male with a history of COPD secondary to tobacco abuse who presents with a chief complaint of right shoulder pain for approximately 3 days.  The patient was accompanied by his wife who provided most of the history.   No history of chronic shoulder pain, trauma or surgery.  Says the shoulder pain is around the anterior margins of the rotator cuff and radiates into the axilla and posterior scapular region.  Symptoms are aggravated with coughing and sneezing.  He does not have any pain with range of motion.  He is not experiencing any weakness.  He tried taking 400 mg ibuprofen which did not help.  Additionally on review of systems the patient says he has been experiencing chest pain and worsening shortness of breath.  Patient was a poor historian and provided limited information.    REVIEW OF SYSTEMS  General: no fever or chills  GI: no nausea or vomiting  See HPI for further details.    PAST MEDICAL HISTORY   has a past medical history of Chronic airway obstruction, not elsewhere classified, Depression, Hyperlipidemia, and Hypertension.    SURGICAL HISTORY  patient denies any surgical history    ALLERGIES  Allergies   Allergen Reactions   • Augmentin Hives     \"Hives all over body\".         CURRENT MEDICATIONS  Home Medications     Reviewed by Mark Villarreal D.O. (Physician) on 04/07/21 at 1951  Med List Status: <None>   Medication Last Dose Status   albuterol 108 (90 Base) MCG/ACT Aero Soln inhalation aerosol PRN Active   amLODIPine (NORVASC) 5 MG Tab Taking Active   atorvastatin (LIPITOR) 20 MG TABS Taking Active   atorvastatin (LIPITOR) 40 MG Tab Not Taking Flagged for Removal   benzonatate (TESSALON) 100 MG Cap Not Taking Flagged for Removal   Calcium Carbonate Antacid (TUMS PO) PRN Active " "  doxycycline (VIBRAMYCIN) 100 MG Tab Not Taking Flagged for Removal   lansoprazole (PREVACID) 30 MG CAPSULE DELAYED RELEASE Taking Active   morphine ER (MS CONTIN) 15 MG Tab CR tablet Taking Active   Multiple Vitamin (MULTI VITAMIN DAILY PO) Taking Active   olanzapine (ZYPREXA) 5 MG TABS Taking Active   Omega-3 Fatty Acids (FISH OIL) 1000 MG Cap capsule Taking Active   paroxetine (PAXIL) 30 MG TABS Taking Active   sumatriptan (IMITREX) 50 MG TABS Not Taking Flagged for Removal   trazodone (DESYREL) 100 MG TABS Taking Active                SOCIAL HISTORY  Social History     Tobacco Use   • Smoking status: Current Every Day Smoker     Packs/day: 1.00     Types: Cigarettes   • Smokeless tobacco: Never Used   Substance and Sexual Activity   • Alcohol use: No   • Drug use: No   • Sexual activity: Never       FAMILY HISTORY  No family history on file.       Objective:   PHYSICAL EXAM  VITAL SIGNS: /80 (BP Location: Right arm, Patient Position: Sitting)   Pulse 87   Temp 37.2 °C (99 °F) (Tympanic)   Resp 20   Ht 1.727 m (5' 8\")   Wt 91 kg (200 lb 9.6 oz)   SpO2 96%   BMI 30.50 kg/m²     Gen: no acute distress, normal voice  Skin: dry, intact, moist mucosal membranes  Lungs: Diminished breath sounds w/ symmetric expansion  CV: RRR w/o murmurs or clicks  MSK: Right shoulder: Full range of motion without any discernible discomfort.  Mild tenderness palpation along the anterolateral margins of the shoulder.  Negative impingement sign.  Positive empty can for pain but no weakness.  Negative liftoff sign.  5 out of 5 strength in all planes.  Full sensation.  Psych: normal affect, normal judgement, alert, awake    Assessment/Plan:     1. Acute pain of right shoulder     2. Chest pain, unspecified type     3. Chronic obstructive pulmonary disease, unspecified COPD type (HCC)     Patient is a poor historian w/ possible underlying dementia, subsequently the history was somewhat limited.  The patient does not experience " shoulder pain with range of motion and only experiences discomfort with coughing; sx are not entirely consistent with a musculoskeletal etiology.  Furthermore the patient was stating he was experiencing worsening chest pain and shortness of breath and am concerned sx are 2/2 cardiopulmonary process rather than msk.  I expressed my concerns and instructed them to go the emergency room for further evaluation and treatment.  The wife said she would take him to the ED after leaving urgent care.      Differential diagnosis, natural history, supportive care, and indications for immediate follow-up discussed. All questions answered. Patient agrees with the plan of care.    Follow-up as needed if symptoms worsen or fail to improve to PCP, Urgent care or Emergency Room.    Please note that this dictation was created using voice recognition software. I have made a reasonable attempt to correct obvious errors, but I expect that there are errors of grammar and possibly content that I did not discover before finalizing the note.

## 2021-04-09 ENCOUNTER — APPOINTMENT (OUTPATIENT)
Dept: RADIOLOGY | Facility: MEDICAL CENTER | Age: 71
End: 2021-04-09
Attending: EMERGENCY MEDICINE
Payer: MEDICARE

## 2021-04-09 ENCOUNTER — HOSPITAL ENCOUNTER (EMERGENCY)
Facility: MEDICAL CENTER | Age: 71
End: 2021-04-09
Attending: EMERGENCY MEDICINE | Admitting: EMERGENCY MEDICINE
Payer: MEDICARE

## 2021-04-09 VITALS
TEMPERATURE: 98.3 F | HEIGHT: 68 IN | HEART RATE: 93 BPM | BODY MASS INDEX: 30.41 KG/M2 | RESPIRATION RATE: 20 BRPM | WEIGHT: 200.62 LBS | DIASTOLIC BLOOD PRESSURE: 85 MMHG | SYSTOLIC BLOOD PRESSURE: 141 MMHG | OXYGEN SATURATION: 93 %

## 2021-04-09 DIAGNOSIS — J44.9 CHRONIC OBSTRUCTIVE PULMONARY DISEASE, UNSPECIFIED COPD TYPE (HCC): ICD-10-CM

## 2021-04-09 DIAGNOSIS — M25.511 ACUTE PAIN OF RIGHT SHOULDER: ICD-10-CM

## 2021-04-09 LAB
ALBUMIN SERPL BCP-MCNC: 4.2 G/DL (ref 3.2–4.9)
ALBUMIN/GLOB SERPL: 1.3 G/DL
ALP SERPL-CCNC: 168 U/L (ref 30–99)
ALT SERPL-CCNC: 25 U/L (ref 2–50)
ANION GAP SERPL CALC-SCNC: 10 MMOL/L (ref 7–16)
APTT PPP: 28.8 SEC (ref 24.7–36)
AST SERPL-CCNC: 22 U/L (ref 12–45)
BASOPHILS # BLD AUTO: 0.7 % (ref 0–1.8)
BASOPHILS # BLD: 0.09 K/UL (ref 0–0.12)
BILIRUB SERPL-MCNC: 0.3 MG/DL (ref 0.1–1.5)
BUN SERPL-MCNC: 11 MG/DL (ref 8–22)
CALCIUM SERPL-MCNC: 9.6 MG/DL (ref 8.4–10.2)
CHLORIDE SERPL-SCNC: 106 MMOL/L (ref 96–112)
CO2 SERPL-SCNC: 27 MMOL/L (ref 20–33)
CREAT SERPL-MCNC: 1.28 MG/DL (ref 0.5–1.4)
EKG IMPRESSION: NORMAL
EOSINOPHIL # BLD AUTO: 0.35 K/UL (ref 0–0.51)
EOSINOPHIL NFR BLD: 2.9 % (ref 0–6.9)
ERYTHROCYTE [DISTWIDTH] IN BLOOD BY AUTOMATED COUNT: 48.9 FL (ref 35.9–50)
FLUAV RNA SPEC QL NAA+PROBE: NEGATIVE
FLUBV RNA SPEC QL NAA+PROBE: NEGATIVE
GLOBULIN SER CALC-MCNC: 3.3 G/DL (ref 1.9–3.5)
GLUCOSE SERPL-MCNC: 113 MG/DL (ref 65–99)
HCT VFR BLD AUTO: 45.2 % (ref 42–52)
HGB BLD-MCNC: 14.4 G/DL (ref 14–18)
IMM GRANULOCYTES # BLD AUTO: 0.05 K/UL (ref 0–0.11)
IMM GRANULOCYTES NFR BLD AUTO: 0.4 % (ref 0–0.9)
INR PPP: 0.9 (ref 0.87–1.13)
LYMPHOCYTES # BLD AUTO: 2.04 K/UL (ref 1–4.8)
LYMPHOCYTES NFR BLD: 16.7 % (ref 22–41)
MCH RBC QN AUTO: 30.3 PG (ref 27–33)
MCHC RBC AUTO-ENTMCNC: 31.9 G/DL (ref 33.7–35.3)
MCV RBC AUTO: 95.2 FL (ref 81.4–97.8)
MONOCYTES # BLD AUTO: 0.88 K/UL (ref 0–0.85)
MONOCYTES NFR BLD AUTO: 7.2 % (ref 0–13.4)
NEUTROPHILS # BLD AUTO: 8.8 K/UL (ref 1.82–7.42)
NEUTROPHILS NFR BLD: 72.1 % (ref 44–72)
NRBC # BLD AUTO: 0 K/UL
NRBC BLD-RTO: 0 /100 WBC
PLATELET # BLD AUTO: 352 K/UL (ref 164–446)
PMV BLD AUTO: 10.1 FL (ref 9–12.9)
POTASSIUM SERPL-SCNC: 4.7 MMOL/L (ref 3.6–5.5)
PROT SERPL-MCNC: 7.5 G/DL (ref 6–8.2)
PROTHROMBIN TIME: 11.9 SEC (ref 12–14.6)
RBC # BLD AUTO: 4.75 M/UL (ref 4.7–6.1)
RSV RNA SPEC QL NAA+PROBE: NEGATIVE
SARS-COV-2 RNA RESP QL NAA+PROBE: NOTDETECTED
SODIUM SERPL-SCNC: 143 MMOL/L (ref 135–145)
SPECIMEN SOURCE: NORMAL
TROPONIN T SERPL-MCNC: 13 NG/L (ref 6–19)
WBC # BLD AUTO: 12.2 K/UL (ref 4.8–10.8)

## 2021-04-09 PROCEDURE — 85730 THROMBOPLASTIN TIME PARTIAL: CPT

## 2021-04-09 PROCEDURE — 71275 CT ANGIOGRAPHY CHEST: CPT | Mod: MH

## 2021-04-09 PROCEDURE — 700111 HCHG RX REV CODE 636 W/ 250 OVERRIDE (IP): Performed by: EMERGENCY MEDICINE

## 2021-04-09 PROCEDURE — 71045 X-RAY EXAM CHEST 1 VIEW: CPT

## 2021-04-09 PROCEDURE — 85025 COMPLETE CBC W/AUTO DIFF WBC: CPT

## 2021-04-09 PROCEDURE — 84484 ASSAY OF TROPONIN QUANT: CPT

## 2021-04-09 PROCEDURE — 700117 HCHG RX CONTRAST REV CODE 255: Performed by: EMERGENCY MEDICINE

## 2021-04-09 PROCEDURE — C9803 HOPD COVID-19 SPEC COLLECT: HCPCS | Performed by: EMERGENCY MEDICINE

## 2021-04-09 PROCEDURE — 99284 EMERGENCY DEPT VISIT MOD MDM: CPT

## 2021-04-09 PROCEDURE — 80053 COMPREHEN METABOLIC PANEL: CPT

## 2021-04-09 PROCEDURE — 85610 PROTHROMBIN TIME: CPT

## 2021-04-09 PROCEDURE — 93005 ELECTROCARDIOGRAM TRACING: CPT | Performed by: EMERGENCY MEDICINE

## 2021-04-09 PROCEDURE — 93005 ELECTROCARDIOGRAM TRACING: CPT

## 2021-04-09 PROCEDURE — 0241U HCHG SARS-COV-2 COVID-19 NFCT DS RESP RNA 4 TRGT MIC: CPT

## 2021-04-09 RX ORDER — PREDNISONE 20 MG/1
20 TABLET ORAL DAILY
Qty: 5 TABLET | Refills: 0 | Status: SHIPPED | OUTPATIENT
Start: 2021-04-09 | End: 2021-04-14

## 2021-04-09 RX ORDER — PREDNISONE 10 MG/1
20 TABLET ORAL ONCE
Status: COMPLETED | OUTPATIENT
Start: 2021-04-09 | End: 2021-04-09

## 2021-04-09 RX ORDER — PREDNISONE 20 MG/1
20 TABLET ORAL DAILY
Qty: 5 TABLET | Refills: 0 | Status: SHIPPED | OUTPATIENT
Start: 2021-04-09 | End: 2021-04-09 | Stop reason: SDUPTHER

## 2021-04-09 RX ADMIN — IOHEXOL 80 ML: 350 INJECTION, SOLUTION INTRAVENOUS at 14:18

## 2021-04-09 RX ADMIN — PREDNISONE 20 MG: 10 TABLET ORAL at 14:53

## 2021-04-09 ASSESSMENT — PAIN DESCRIPTION - DESCRIPTORS: DESCRIPTORS: ACHING

## 2021-04-09 NOTE — ED NOTES
Discharge instructions given and discussed. RX for prednisone  given and pt educated. Pt educated to come back to ER for new or worsening symptoms and follow up with PCP as instructed. Pt verbalized understanding. VSS. Pt  Discharged in stable condition.

## 2021-04-09 NOTE — ED TRIAGE NOTES
"Pt was seen at [facility] this past Tuesday and evaluated for recurrence of right chest pain (worse with inhalation) for the previous 3 days with associated exertional dyspnea.  He reports a hx of COPD.  Chief Complaint   Patient presents with   • COPD   • Chest Pain   • Shortness of Breath     /78   Pulse 92   Temp 36.6 °C (97.8 °F) (Temporal)   Resp 20   Ht 1.727 m (5' 8\")   Wt 91 kg (200 lb 9.9 oz)   SpO2 94%   BMI 30.50 kg/m²      "

## 2021-04-10 NOTE — ED PROVIDER NOTES
ED Provider Note    CHIEF COMPLAINT  Chief Complaint   Patient presents with   • COPD   • Chest Pain   • Shortness of Breath       HPI  Ewdin Gregorio Jr. is a 70 y.o. male who presents to the emergency department who is brought in by his wife for evaluation.  The patient himself does not provide much history I think likely secondary to dementia.  According to his wife he has been complaining of shortness of breath and cough and complaining of right shoulder and some right-sided chest discomfort.  His cough is producing yellow sputum.  The patient was seen in the urgent care on Wednesday and told to come to the emergency department and because of persistent symptoms has come in today.  There are no recognized precipitating events or exacerbating relieving factors.    REVIEW OF SYSTEMS no fever no hemoptysis no vomiting no complaints of lower abdominal pain.  All other systems negative    PAST MEDICAL HISTORY  Past Medical History:   Diagnosis Date   • Chronic airway obstruction, not elsewhere classified    • Depression    • Hyperlipidemia    • Hypertension        FAMILY HISTORY  No family history on file.    SOCIAL HISTORY  Social History     Socioeconomic History   • Marital status:      Spouse name: Not on file   • Number of children: Not on file   • Years of education: Not on file   • Highest education level: Not on file   Occupational History   • Not on file   Tobacco Use   • Smoking status: Current Every Day Smoker     Packs/day: 1.00     Types: Cigarettes   • Smokeless tobacco: Never Used   Substance and Sexual Activity   • Alcohol use: No   • Drug use: No   • Sexual activity: Never   Other Topics Concern   • Not on file   Social History Narrative   • Not on file     Social Determinants of Health     Financial Resource Strain:    • Difficulty of Paying Living Expenses:    Food Insecurity:    • Worried About Running Out of Food in the Last Year:    • Ran Out of Food in the Last Year:    Transportation  "Needs:    • Lack of Transportation (Medical):    • Lack of Transportation (Non-Medical):    Physical Activity:    • Days of Exercise per Week:    • Minutes of Exercise per Session:    Stress:    • Feeling of Stress :    Social Connections:    • Frequency of Communication with Friends and Family:    • Frequency of Social Gatherings with Friends and Family:    • Attends [employer] Services:    • Active Member of Clubs or Organizations:    • Attends Club or Organization Meetings:    • Marital Status:    Intimate Partner Violence:    • Fear of Current or Ex-Partner:    • Emotionally Abused:    • Physically Abused:    • Sexually Abused:        SURGICAL HISTORY  No past surgical history on file.    CURRENT MEDICATIONS  Home Medications    **Home medications have not yet been reviewed for this encounter**         ALLERGIES  Allergies   Allergen Reactions   • Augmentin Hives     \"Hives all over body\".         PHYSICAL EXAM  VITAL SIGNS: /85   Pulse 93   Temp 36.8 °C (98.3 °F) (Temporal)   Resp 20   Ht 1.727 m (5' 8\")   Wt 91 kg (200 lb 9.9 oz)   SpO2 93%   BMI 30.50 kg/m²    Oxygen saturation is interpreted as adequate  Constitutional: The patient is awake he is minimally verbal but alert and he does not appear distressed  Eyes: No erythema discharge or jaundice  Neck: Trachea midline no JVD  Cardiovascular: Regular rate and rhythm  Lungs: Minimal expiratory wheezes but basically clear lung fields and he does not appear distressed  Abdomen/Back: Soft nontender nondistended no rebound guarding or peritoneal findings bowel sounds are present  Skin: Warm and dry  Musculoskeletal: No acute bony deformity no leg edema or calf tenderness.  The right shoulder looks normal there is no bruises or swelling or erythema or any sign of deformity there is no sulcus sign he is adequate range of motion and I do not see any vesicles overlying the skin in that area.  Neurologic: Awake verbal and moving all " extremities    Laboratory  CBC shows white blood cell count of 12.2 hemoglobin is adequate at 14.4 complete metabolic panel is unremarkable troponin is normal at 13 INR is normal 0.9 Covid testing is negative.    EKG interpretation  Twelve-lead EKG shows sinus rhythm 61 bpm there is a left axis deviation, no pathologic ST elevation or depression.  Findings are similar to the EKG from May 29, 2018    Radiology  CT-CTA CHEST PULMONARY ARTERY W/ RECONS   Final Result      1.  No evidence of pulmonary embolism.      2.  Mild cardiomegaly.      3.  Clear minor alveolar opacity in each lower lobe which represent minor alveolar edema. No lobar consolidation or pleural effusion.            DX-CHEST-PORTABLE (1 VIEW)   Final Result      Mild left basilar atelectasis.          MEDICAL DECISION MAKING and DISPOSITION  In the emergency department I reviewed all the findings with the patient and his wife and his wife says that he has been wheezy and thinks that this is likely secondary to his COPD and we are going to start the patient on a short course of prednisone for this.  The patient and his wife do not feel that he needs an albuterol nebulizer treatment at this time.  I think will be safe for the patient to go home however I have recommended that if he feels he is having new or worsening symptoms or his family feels he is getting any worse they are to return here at once for recheck.  Otherwise the family is instructed to call their primary care doctor Monday morning and arrange office recheck during the week    IMPRESSION  1.  Acute COPD exacerbation  2.  Right shoulder and chest wall discomfort      Electronically signed by: Cas Nielsen M.D., 4/9/2021 5:07 PM

## 2021-04-17 ENCOUNTER — IMMUNIZATION (OUTPATIENT)
Dept: FAMILY PLANNING/WOMEN'S HEALTH CLINIC | Facility: IMMUNIZATION CENTER | Age: 71
End: 2021-04-17
Attending: INTERNAL MEDICINE
Payer: MEDICARE

## 2021-04-17 DIAGNOSIS — Z23 ENCOUNTER FOR VACCINATION: Primary | ICD-10-CM

## 2021-04-17 PROCEDURE — 91301 MODERNA SARS-COV-2 VACCINE: CPT | Performed by: NURSE PRACTITIONER

## 2021-04-17 PROCEDURE — 0012A MODERNA SARS-COV-2 VACCINE: CPT | Performed by: NURSE PRACTITIONER

## 2022-07-16 ENCOUNTER — HOSPITAL ENCOUNTER (OUTPATIENT)
Dept: RADIOLOGY | Facility: MEDICAL CENTER | Age: 72
End: 2022-07-16
Attending: NURSE PRACTITIONER
Payer: MEDICARE

## 2022-07-16 DIAGNOSIS — M54.16 RADICULOPATHY OF LUMBAR REGION: ICD-10-CM

## 2022-07-16 PROCEDURE — 72100 X-RAY EXAM L-S SPINE 2/3 VWS: CPT

## 2022-07-16 PROCEDURE — 72148 MRI LUMBAR SPINE W/O DYE: CPT | Mod: MH

## 2022-11-08 ENCOUNTER — PATIENT MESSAGE (OUTPATIENT)
Dept: HEALTH INFORMATION MANAGEMENT | Facility: OTHER | Age: 72
End: 2022-11-08

## 2025-07-15 ENCOUNTER — OFFICE VISIT (OUTPATIENT)
Dept: MEDICAL GROUP | Age: 75
End: 2025-07-15
Payer: MEDICARE

## 2025-07-15 VITALS
OXYGEN SATURATION: 97 % | HEART RATE: 84 BPM | BODY MASS INDEX: 27.28 KG/M2 | DIASTOLIC BLOOD PRESSURE: 60 MMHG | TEMPERATURE: 98.4 F | WEIGHT: 184.2 LBS | SYSTOLIC BLOOD PRESSURE: 102 MMHG | HEIGHT: 69 IN

## 2025-07-15 DIAGNOSIS — I10 PRIMARY HYPERTENSION: ICD-10-CM

## 2025-07-15 DIAGNOSIS — Z13.6 SCREENING FOR AAA (AORTIC ABDOMINAL ANEURYSM): ICD-10-CM

## 2025-07-15 DIAGNOSIS — Z12.11 SCREENING FOR COLORECTAL CANCER: ICD-10-CM

## 2025-07-15 DIAGNOSIS — E78.5 HYPERLIPIDEMIA, UNSPECIFIED HYPERLIPIDEMIA TYPE: ICD-10-CM

## 2025-07-15 DIAGNOSIS — F17.210 SMOKING GREATER THAN 25 PACK YEARS: ICD-10-CM

## 2025-07-15 DIAGNOSIS — Z76.89 ENCOUNTER TO ESTABLISH CARE: Primary | ICD-10-CM

## 2025-07-15 DIAGNOSIS — F32.A DEPRESSION, UNSPECIFIED DEPRESSION TYPE: ICD-10-CM

## 2025-07-15 DIAGNOSIS — M54.9 CHRONIC BACK PAIN, UNSPECIFIED BACK LOCATION, UNSPECIFIED BACK PAIN LATERALITY: ICD-10-CM

## 2025-07-15 DIAGNOSIS — J44.9 COPD WITHOUT EXACERBATION (HCC): ICD-10-CM

## 2025-07-15 DIAGNOSIS — Z12.5 PROSTATE CANCER SCREENING: ICD-10-CM

## 2025-07-15 DIAGNOSIS — G89.29 CHRONIC BACK PAIN, UNSPECIFIED BACK LOCATION, UNSPECIFIED BACK PAIN LATERALITY: ICD-10-CM

## 2025-07-15 DIAGNOSIS — H91.93 DECREASED HEARING OF BOTH EARS: ICD-10-CM

## 2025-07-15 DIAGNOSIS — F51.01 PRIMARY INSOMNIA: ICD-10-CM

## 2025-07-15 DIAGNOSIS — K21.9 GASTROESOPHAGEAL REFLUX DISEASE WITHOUT ESOPHAGITIS: ICD-10-CM

## 2025-07-15 DIAGNOSIS — Z12.12 SCREENING FOR COLORECTAL CANCER: ICD-10-CM

## 2025-07-15 DIAGNOSIS — E66.3 OVERWEIGHT (BMI 25.0-29.9): ICD-10-CM

## 2025-07-15 DIAGNOSIS — Z12.2 SCREENING FOR LUNG CANCER: ICD-10-CM

## 2025-07-15 DIAGNOSIS — Z13.29 THYROID DISORDER SCREEN: ICD-10-CM

## 2025-07-15 DIAGNOSIS — F11.90 CHRONIC, CONTINUOUS USE OF OPIOIDS: ICD-10-CM

## 2025-07-15 DIAGNOSIS — Z13.0 SCREENING FOR DEFICIENCY ANEMIA: ICD-10-CM

## 2025-07-15 PROBLEM — G93.40 ACUTE ENCEPHALOPATHY: Status: RESOLVED | Noted: 2018-05-29 | Resolved: 2025-07-15

## 2025-07-15 PROBLEM — E87.1 HYPONATREMIA: Status: RESOLVED | Noted: 2018-05-29 | Resolved: 2025-07-15

## 2025-07-15 PROBLEM — D72.829 LEUKOCYTOSIS: Status: RESOLVED | Noted: 2018-05-29 | Resolved: 2025-07-15

## 2025-07-15 PROBLEM — J43.8 OTHER EMPHYSEMA (HCC): Status: RESOLVED | Noted: 2025-07-15 | Resolved: 2025-07-15

## 2025-07-15 PROBLEM — J43.8 OTHER EMPHYSEMA (HCC): Status: ACTIVE | Noted: 2025-07-15

## 2025-07-15 PROCEDURE — 3074F SYST BP LT 130 MM HG: CPT | Performed by: STUDENT IN AN ORGANIZED HEALTH CARE EDUCATION/TRAINING PROGRAM

## 2025-07-15 PROCEDURE — 99204 OFFICE O/P NEW MOD 45 MIN: CPT | Performed by: STUDENT IN AN ORGANIZED HEALTH CARE EDUCATION/TRAINING PROGRAM

## 2025-07-15 PROCEDURE — 3078F DIAST BP <80 MM HG: CPT | Performed by: STUDENT IN AN ORGANIZED HEALTH CARE EDUCATION/TRAINING PROGRAM

## 2025-07-15 RX ORDER — LANSOPRAZOLE 30 MG/1
30 CAPSULE, DELAYED RELEASE ORAL EVERY EVENING
Qty: 100 CAPSULE | Refills: 3 | Status: SHIPPED | OUTPATIENT
Start: 2025-07-15

## 2025-07-15 RX ORDER — CLONIDINE HYDROCHLORIDE 0.1 MG/1
0.1 TABLET ORAL 2 TIMES DAILY
COMMUNITY

## 2025-07-16 NOTE — PROGRESS NOTES
Verbal consent was acquired by the patient to use [employer] ambient listening note generation during this visit     Subjective:     HPI:   History of Present Illness  The patient is a 74-year-old male who presents to establish care. He is accompanied by his wife.    The primary reason for this visit is to establish care as his previous primary care physician. He has a history of appendectomy at age 7.5 and tonsillectomy at age 2.5. His father was diagnosed with colon cancer at age 69. There is no family history of diabetes.    He smokes half a pack of cigarettes daily and is not sexually active. He is not a vegetarian and takes vitamin D supplements. He has received one pneumococcal vaccine and declines further vaccinations. He has hearing issues and uses hearing aids, which are currently broken. He has had hearing and eye problems since childhood. He does not have power of  and can make decisions for himself, but he tends to forget discussions easily. He is on lansoprazole for acid reflux.    He has COPD and uses an albuterol inhaler prn. He does not have a pulmonologist.    He has chronic back pain and is under the care of a pain management specialist. He is on morphine for pain management.    He has a mood disorder and was previously under the care of a psychiatrist, but it has been 5 years since his last visit. He is on olanzapine for mood stabilization, paroxetine for depression, and trazodone for sleep.    He has hypertension and is on amlodipine 5 mg daily.    He has hypercholesterolemia and is on a statin.    His sleep pattern is irregular, often waking up at night and napping during the day. He does not use CPAP and does not snore. He feels tired, fatigued, and sleepy throughout the day. His oxygen levels are stable.    Tobacco: The patient smokes half a pack of cigarettes daily.  Sleep: The patient has an irregular sleep pattern, often waking up at night and napping during the day.  Sexual  "Practices: The patient is not sexually active.    PAST SURGICAL HISTORY:  - Appendectomy at age 7.5  - Tonsillectomy at age 2.5    FAMILY HISTORY  His father was diagnosed with colon cancer at age 69. He has no family history of diabetes.    No n/v/d/c, fever, chills, sob, chest pain      Objective:     Exam:  /60   Pulse 84   Temp 36.9 °C (98.4 °F)   Ht 1.753 m (5' 9\")   Wt 83.6 kg (184 lb 3.2 oz)   SpO2 97%   BMI 27.20 kg/m²  Body mass index is 27.2 kg/m².    Physical Exam  Gen: nad, lethargic  HENT: ncat, EOMI  Resp: ctabl  Cardiac: rrr, murmur+ rt second intercostal 2+ systolic ejection  GI: nt/nd  Neuro: no focal deficits, cn 2-12 intact throughout, sensation to light touch intact throughout  Psych: flat affect      Results      Assessment & Plan:     1. Encounter to establish care        2. Screening for colorectal cancer  Referral to GI for Colonoscopy      3. Screening for lung cancer  REFERRAL TO LUNG CANCER SCREENING PROGRAM      4. Smoking greater than 25 pack years  REFERRAL TO LUNG CANCER SCREENING PROGRAM    US-ABDOMINAL AORTA W/O DOPPLER      5. Screening for AAA (aortic abdominal aneurysm)  US-ABDOMINAL AORTA W/O DOPPLER      6. Screening for deficiency anemia  CBC WITHOUT DIFFERENTIAL      7. Chronic back pain, unspecified back location, unspecified back pain laterality        8. Chronic, continuous use of opioids        9. Depression, unspecified depression type  Referral to Psychiatry      10. Primary insomnia        11. Prostate cancer screening  PROSTATE SPECIFIC AG SCREENING      12. Primary hypertension  CBC WITHOUT DIFFERENTIAL    Comp Metabolic Panel      13. Overweight (BMI 25.0-29.9)  Lipid Profile    TSH WITH REFLEX TO FT4    VITAMIN D,25 HYDROXY (DEFICIENCY)      14. Thyroid disorder screen  TSH WITH REFLEX TO FT4      15. Hyperlipidemia, unspecified hyperlipidemia type  Lipid Profile      16. COPD without exacerbation (HCC)  Referral to Pulmonary and Sleep Medicine      17. " Decreased hearing of both ears        18. Gastroesophageal reflux disease without esophagitis  lansoprazole (PREVACID) 30 MG CAPSULE DELAYED RELEASE          Assessment & Plan  1. Establishment of care.  - He is due for a pneumococcal vaccine this month, which he has declined.  - Current medications include amlodipine 5 mg daily for hypertension, a statin for hypercholesterolemia, morphine for chronic back pain, olanzapine for mood stabilization, paroxetine for depression, trazodone for sleep, clonidine for tachycardia, and lansoprazole for acid reflux.  - A referral for lung cancer screening will be made due to his smoking history. An ultrasound will be ordered to screen for abdominal aneurysm. Fasting glucose will be checked, and if elevated, A1c will be ordered. A referral to psychiatry will be made to evaluate his medications and psychiatric conditions. A referral to pulmonology will be made. A sleep study will be ordered. He is advised to get hearing devices as soon as possible. He is advised to bring his medical records from his previous PCP.  - A prescription for lansoprazole 100 tablets with three refills will be sent to the pharmacy.    2. Chronic obstructive pulmonary disease.  - Uses an albuterol inhaler for COPD.  - No pulmonologist currently involved in care.  - A referral to pulmonology will be made.  - Oxygen levels are stable.    3. Chronic back pain.  - Managed with morphine.  - Chronic opioid use noted.  - Pain management is currently overseen by a pain management specialist.  - No changes to current pain management plan.    4. Psychiatric disorder  - On olanzapine for mood stabilization, paroxetine for depression, and trazodone for sleep.  - No recent psychiatric follow-up.  - A referral to psychiatry will be made to evaluate his medications and psychiatric conditions.  - Records from previous PCP will be requested to confirm diagnosis and treatment plan.    5. ?Tachycardia.  - On clonidine for  tachycardia.  - Clonidine prescribed by previous PCP for fast heart rate.  - Further assessment needed to determine appropriate management.  - Records from previous PCP will be requested to confirm diagnosis and treatment plan.    6. Hypertension.  - On amlodipine 5 mg daily for hypertension.  - Blood pressure management appears stable.  - No changes to current hypertension management plan.    7. Hypercholesterolemia.  - On a statin for hypercholesterolemia.  - Lipid panel will be included in upcoming lab work.  - No changes to current hypercholesterolemia management plan. Cont current statin.     8. Sleep issues.  - Does not use CPAP and does not snore.  - Feels tired, fatigued, and sleepy throughout the day.  - Oxygen levels are stable.  - A sleep study will be ordered in the future to further evaluate sleep issues.    9. GERD   - Uses lansoprazole    Follow-up  - Follow up in 1 to 2 months with labs and records.          Return in about 2 months (around 9/15/2025) for lab results and records.    Please note that this dictation was created using voice recognition software. I have made every reasonable attempt to correct obvious errors, but I expect that there are errors of grammar and possibly content that I did not discover before finalizing the note.

## 2025-07-16 NOTE — Clinical Note
REFERRAL APPROVAL NOTICE         Sent on July 16, 2025                   Edwin Tristankalie Masters  [address]  [place] [address]                   Dear Mr. Gregorio,    After a careful review of the medical information and benefit coverage, [facility] has processed your referral. See below for additional details.    If applicable, you must be actively enrolled with your insurance for coverage of the authorized service. If you have any questions regarding your coverage, please contact your insurance directly.    REFERRAL INFORMATION   Referral #:  [id number]  Referred-To Department    Referred-By Provider:  Pulmonary and Sleep Medicine    Graeme Red M.D.   Pulmonary/sleep [facility]      [address] [place] [address]  [phone] [address], [facility] [address]  [place] [id number] [phone]    Referral Start Date:  07/15/2025  Referral End Date:   07/15/2026           SCHEDULING  If you do not already have an appointment, please call [phone] to make an appointment.   MORE INFORMATION  As a reminder, [facility] ownership has changed, meaning this location is now owned and operated by [facility]. As such, we want to clarify that our patients should expect to receive two separate bills for the services received at [facility] - one representing the [facility] facility fees as the owner of the establishment, and the other to represent the physician's services and subsequent fees. You can speak with your insurance carrier for a pricing estimate by calling the customer service number on the back of your card and ask about charges for a hospital outpatient visit.  If you do not already have a [employer] account, sign up at: [email or web address]  You can access your medical information, make appointments, see lab results, billing information,  and more.  If you have questions regarding this referral, please contact  the [facility] department at:             [phone]. Monday - Friday 7:30AM - 5:00PM.      Sincerely,  [facility]

## 2025-07-17 ENCOUNTER — TELEPHONE (OUTPATIENT)
Dept: HEALTH INFORMATION MANAGEMENT | Facility: OTHER | Age: 75
End: 2025-07-17
Payer: MEDICARE

## 2025-07-17 NOTE — TELEPHONE ENCOUNTER
"Lung Cancer Screening Q&A  -please ensure accurate responses to all questions-    Referred by: Graeme Red    Inclusion Criteria:  - patient must meet all criteria-    Age: 50-77yrs of age, if patient is currently 78yrs old, please ensure there will be enough time for them to complete ILCS and LDCT before 79th birthday.        PLEASE ENTER PATIENTS AGE: 74 yrs    2. Current smoker or if quit, has pt quit within last 15 yrs? CURRENT    3. What is the total number of years patient has smoked cigarettes? 50 YRS    4. What is the average packs per day over total years smoking? (Can be mulitple answers)  HALF A PACK  - (EX: 20 yrs total : 5 years at 2 packs per day, 15 years at 1 pack per day.)    5. Does patient meet minimum requirement of 20 PACK YEAR HISTORY?  YES            Calculate pack year History. [Total years x average packs per day = pack yr history]          25    Exclusion Criteria  - patients should have not completed a CT chest imaging in the last 12 months, patients cannot have prior lung cancer DX, any nodules on prior CT chest imaging must be \"stable\" and not newly identified, patient should have no new, worsening, unexplained symptoms associated with active lung cancer.    5. Has patient ever completed a Lung Cancer Screening CT? NO  - if yes, where was it completed? N/A  - if yes, please include last LDCT date. N/A    6. Has patient completed any CT Chest imaging? YES   - If so when was last CT chest imaging? 2012    7. On any prior CT chest imaging was anything noted for the Lungs? NO      8. Has patient developed any of the following symptoms that are new or worsening in the last 12 months and unattributed to an existing DX ?  NO     -if yes, is the symptom new within the last 12 months? N/A  If yes, is the symptom explained (patient has been seen by a healthcare provider and symptom has been attributed to a DX)? N/A    9. Previous History of Lung Cancer? NO  - if yes, include DX date ,specific " cancer DX , and oncologist if available.      Patient Meets all Inclusion / exclusion criteria?  YES

## 2025-07-17 NOTE — TELEPHONE ENCOUNTER
Outcome: Left VM      Patient has a referral to the Lung Cancer Screening program. Before scheduling with Dr. Amado, please verify eligibility.    Transfer to Medical Group Outbound [phone].    Attempt: 1

## 2025-07-22 NOTE — Clinical Note
REFERRAL APPROVAL NOTICE         Sent on July 22, 2025                   Edwin Arron Jg Masters  [address]  [place] [address]                   Dear Mr. Gregorio,    After a careful review of the medical information and benefit coverage, [facility] has processed your referral. See below for additional details.    If applicable, you must be actively enrolled with your insurance for coverage of the authorized service. If you have any questions regarding your coverage, please contact your insurance directly.    REFERRAL INFORMATION   Referral #:  [id number]  Referred-To Department    Referred-By Provider:  Behavioral Health    Graeme Red M.D.   Behavioral Health Outpatient      [address] [place] [address]  [phone] [address] [address]  [place] [id number] [phone]    Referral Start Date:  07/15/2025  Referral End Date:   07/15/2026             SCHEDULING  If you do not already have an appointment, please call [phone] to make an appointment.     MORE INFORMATION  If you do not already have a My[employer] account, sign up at: [email or web address]  You can access your medical information, make appointments, see lab results, billing information, and more.  If you have questions regarding this referral, please contact  the [facility] Referrals department at:             [phone]. Monday - Friday 8:00AM - 5:00PM.     Sincerely,    [facility]

## 2025-07-22 NOTE — Clinical Note
REFERRAL APPROVAL NOTICE         Sent on July 22, 2025                   Edwin Heller Jg Masters  [address]  [place] [address]                   Dear Mr. Gregorio,    After a careful review of the medical information and benefit coverage, [facility] has processed your referral. See below for additional details.    If applicable, you must be actively enrolled with your insurance for coverage of the authorized service. If you have any questions regarding your coverage, please contact your insurance directly.    REFERRAL INFORMATION   Referral #:  [id number]  Referred-To Provider    Referred-By Provider:  Gastroenterology    Graeme Red M.D.   [facility]      [address] [place] [address]  [phone] [address] [address]  [phone]    Referral Start Date:  07/15/2025  Referral End Date:   07/15/2026             SCHEDULING  If you do not already have an appointment, please call [phone] to make an appointment.     MORE INFORMATION  If you do not already have a [facility] account, sign up at: [email or web address]  You can access your medical information, make appointments, see lab results, billing information, and more.  If you have questions regarding this referral, please contact  the [facility] Referrals department at:             [phone]. Monday - Friday 8:00AM - 5:00PM.     Sincerely,    [facility]

## 2025-08-16 ENCOUNTER — HOSPITAL ENCOUNTER (OUTPATIENT)
Dept: LAB | Facility: MEDICAL CENTER | Age: 75
End: 2025-08-16
Attending: STUDENT IN AN ORGANIZED HEALTH CARE EDUCATION/TRAINING PROGRAM
Payer: MEDICARE

## 2025-08-16 DIAGNOSIS — Z13.29 THYROID DISORDER SCREEN: ICD-10-CM

## 2025-08-16 DIAGNOSIS — I10 PRIMARY HYPERTENSION: ICD-10-CM

## 2025-08-16 DIAGNOSIS — Z13.0 SCREENING FOR DEFICIENCY ANEMIA: ICD-10-CM

## 2025-08-16 DIAGNOSIS — E66.3 OVERWEIGHT (BMI 25.0-29.9): ICD-10-CM

## 2025-08-16 DIAGNOSIS — E78.5 HYPERLIPIDEMIA, UNSPECIFIED HYPERLIPIDEMIA TYPE: ICD-10-CM

## 2025-08-16 DIAGNOSIS — Z12.5 PROSTATE CANCER SCREENING: ICD-10-CM

## 2025-08-16 LAB
25(OH)D3 SERPL-MCNC: 33 NG/ML (ref 30–100)
ALBUMIN SERPL BCP-MCNC: 4 G/DL (ref 3.2–4.9)
ALBUMIN/GLOB SERPL: 1.3 G/DL
ALP SERPL-CCNC: 141 U/L (ref 30–99)
ALT SERPL-CCNC: 17 U/L (ref 2–50)
ANION GAP SERPL CALC-SCNC: 15 MMOL/L (ref 7–16)
AST SERPL-CCNC: 27 U/L (ref 12–45)
BILIRUB SERPL-MCNC: 0.5 MG/DL (ref 0.1–1.5)
BUN SERPL-MCNC: 14 MG/DL (ref 8–22)
CALCIUM ALBUM COR SERPL-MCNC: 9 MG/DL (ref 8.5–10.5)
CALCIUM SERPL-MCNC: 9 MG/DL (ref 8.5–10.5)
CHLORIDE SERPL-SCNC: 103 MMOL/L (ref 96–112)
CHOLEST SERPL-MCNC: 145 MG/DL (ref 100–199)
CO2 SERPL-SCNC: 21 MMOL/L (ref 20–33)
CREAT SERPL-MCNC: 1.6 MG/DL (ref 0.5–1.4)
ERYTHROCYTE [DISTWIDTH] IN BLOOD BY AUTOMATED COUNT: 54 FL (ref 35.9–50)
FASTING STATUS PATIENT QL REPORTED: NORMAL
GFR SERPLBLD CREATININE-BSD FMLA CKD-EPI: 45 ML/MIN/1.73 M 2
GLOBULIN SER CALC-MCNC: 3.1 G/DL (ref 1.9–3.5)
GLUCOSE SERPL-MCNC: 110 MG/DL (ref 65–99)
HCT VFR BLD AUTO: 45.9 % (ref 42–52)
HDLC SERPL-MCNC: 28 MG/DL
HGB BLD-MCNC: 14.9 G/DL (ref 14–18)
LDLC SERPL CALC-MCNC: 66 MG/DL
MCH RBC QN AUTO: 29.6 PG (ref 27–33)
MCHC RBC AUTO-ENTMCNC: 32.5 G/DL (ref 32.3–36.5)
MCV RBC AUTO: 91.1 FL (ref 81.4–97.8)
PLATELET # BLD AUTO: 368 K/UL (ref 164–446)
PMV BLD AUTO: 10.4 FL (ref 9–12.9)
POTASSIUM SERPL-SCNC: 3.9 MMOL/L (ref 3.6–5.5)
PROT SERPL-MCNC: 7.1 G/DL (ref 6–8.2)
PSA SERPL DL<=0.01 NG/ML-MCNC: 0.2 NG/ML (ref 0–4)
RBC # BLD AUTO: 5.04 M/UL (ref 4.7–6.1)
SODIUM SERPL-SCNC: 139 MMOL/L (ref 135–145)
TRIGL SERPL-MCNC: 255 MG/DL (ref 0–149)
TSH SERPL DL<=0.005 MIU/L-ACNC: 2.89 UIU/ML (ref 0.38–5.33)
WBC # BLD AUTO: 12.7 K/UL (ref 4.8–10.8)

## 2025-08-16 PROCEDURE — 84153 ASSAY OF PSA TOTAL: CPT | Mod: GA

## 2025-08-16 PROCEDURE — 85027 COMPLETE CBC AUTOMATED: CPT

## 2025-08-16 PROCEDURE — 84443 ASSAY THYROID STIM HORMONE: CPT

## 2025-08-16 PROCEDURE — 80061 LIPID PANEL: CPT

## 2025-08-16 PROCEDURE — 36415 COLL VENOUS BLD VENIPUNCTURE: CPT

## 2025-08-16 PROCEDURE — 80053 COMPREHEN METABOLIC PANEL: CPT

## 2025-08-16 PROCEDURE — 82306 VITAMIN D 25 HYDROXY: CPT | Mod: GA

## 2025-08-18 DIAGNOSIS — N18.31 STAGE 3A CHRONIC KIDNEY DISEASE: Primary | ICD-10-CM
